# Patient Record
Sex: MALE | Race: BLACK OR AFRICAN AMERICAN | NOT HISPANIC OR LATINO | URBAN - METROPOLITAN AREA
[De-identification: names, ages, dates, MRNs, and addresses within clinical notes are randomized per-mention and may not be internally consistent; named-entity substitution may affect disease eponyms.]

---

## 2019-02-13 ENCOUNTER — OUTPATIENT (OUTPATIENT)
Dept: OUTPATIENT SERVICES | Facility: HOSPITAL | Age: 69
LOS: 1 days | End: 2019-02-13
Payer: MEDICARE

## 2019-02-13 VITALS
SYSTOLIC BLOOD PRESSURE: 127 MMHG | OXYGEN SATURATION: 99 % | HEART RATE: 66 BPM | WEIGHT: 235.01 LBS | HEIGHT: 74 IN | DIASTOLIC BLOOD PRESSURE: 71 MMHG | RESPIRATION RATE: 18 BRPM | TEMPERATURE: 98 F

## 2019-02-13 DIAGNOSIS — Z90.79 ACQUIRED ABSENCE OF OTHER GENITAL ORGAN(S): Chronic | ICD-10-CM

## 2019-02-13 DIAGNOSIS — E78.5 HYPERLIPIDEMIA, UNSPECIFIED: ICD-10-CM

## 2019-02-13 DIAGNOSIS — I10 ESSENTIAL (PRIMARY) HYPERTENSION: ICD-10-CM

## 2019-02-13 DIAGNOSIS — Z01.818 ENCOUNTER FOR OTHER PREPROCEDURAL EXAMINATION: ICD-10-CM

## 2019-02-13 DIAGNOSIS — M25.512 PAIN IN LEFT SHOULDER: ICD-10-CM

## 2019-02-13 PROCEDURE — G0463: CPT

## 2019-02-13 RX ORDER — SODIUM CHLORIDE 9 MG/ML
3 INJECTION INTRAMUSCULAR; INTRAVENOUS; SUBCUTANEOUS EVERY 8 HOURS
Qty: 0 | Refills: 0 | Status: DISCONTINUED | OUTPATIENT
Start: 2019-02-22 | End: 2019-03-02

## 2019-02-13 NOTE — H&P PST ADULT - NSANTHOSAYNRD_GEN_A_CORE
No. KYLE screening performed.  STOP BANG Legend: 0-2 = LOW Risk; 3-4 = INTERMEDIATE Risk; 5-8 = HIGH Risk

## 2019-02-13 NOTE — H&P PST ADULT - ASSESSMENT
69 yr old male with PMH of HTN, HLD, gout, prostate cancer treated with surgery presents with left shoulder pain.  Pt for left shoulder arthroscopy on 2/22/2019.

## 2019-02-13 NOTE — H&P PST ADULT - NEGATIVE CARDIOVASCULAR SYMPTOMS
no orthopnea/no peripheral edema/no paroxysmal nocturnal dyspnea/no palpitations/no dyspnea on exertion/no chest pain/no claudication

## 2019-02-13 NOTE — H&P PST ADULT - RS GEN PE MLT RESP DETAILS PC
normal/clear to auscultation bilaterally/good air movement/no chest wall tenderness/no intercostal retractions/breath sounds equal/airway patent/no rhonchi/no subcutaneous emphysema/no rales/no wheezes/respirations non-labored

## 2019-02-13 NOTE — H&P PST ADULT - FAMILY HISTORY
Mother  Still living? No  Cerebrovascular accident, Age at diagnosis: Age Unknown     Father  Still living? No  Family history of prostate cancer in father, Age at diagnosis: Age Unknown     Sibling  Still living? No  Family history of throat cancer, Age at diagnosis: Age Unknown  Family history of stomach cancer, Age at diagnosis: Age Unknown

## 2019-02-13 NOTE — H&P PST ADULT - PSH
History of abdominal prostatectomy    History of robot-assisted laparoscopic radical prostatectomy History of abdominal prostatectomy    History of lithotripsy    History of robot-assisted laparoscopic radical prostatectomy
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
## 2019-02-13 NOTE — H&P PST ADULT - NEGATIVE GASTROINTESTINAL SYMPTOMS
no diarrhea/no melena/no change in bowel habits/no vomiting/no constipation/no abdominal pain/no nausea/no flatulence

## 2019-02-13 NOTE — H&P PST ADULT - PMH
Gout    HLD (hyperlipidemia)    HTN (hypertension)    Prostate cancer Gout    HLD (hyperlipidemia)    HTN (hypertension)    Pain in left shoulder    Prostate cancer

## 2019-02-20 DIAGNOSIS — Z98.890 OTHER SPECIFIED POSTPROCEDURAL STATES: Chronic | ICD-10-CM

## 2019-02-21 ENCOUNTER — TRANSCRIPTION ENCOUNTER (OUTPATIENT)
Age: 69
End: 2019-02-21

## 2019-02-22 ENCOUNTER — RESULT REVIEW (OUTPATIENT)
Age: 69
End: 2019-02-22

## 2019-02-22 ENCOUNTER — OUTPATIENT (OUTPATIENT)
Dept: OUTPATIENT SERVICES | Facility: HOSPITAL | Age: 69
LOS: 1 days | End: 2019-02-22
Payer: MEDICARE

## 2019-02-22 VITALS
SYSTOLIC BLOOD PRESSURE: 133 MMHG | DIASTOLIC BLOOD PRESSURE: 73 MMHG | HEART RATE: 62 BPM | HEIGHT: 74 IN | OXYGEN SATURATION: 97 % | WEIGHT: 235.01 LBS | RESPIRATION RATE: 18 BRPM | TEMPERATURE: 97 F

## 2019-02-22 VITALS
RESPIRATION RATE: 16 BRPM | OXYGEN SATURATION: 99 % | HEART RATE: 66 BPM | TEMPERATURE: 98 F | DIASTOLIC BLOOD PRESSURE: 61 MMHG | SYSTOLIC BLOOD PRESSURE: 116 MMHG

## 2019-02-22 DIAGNOSIS — M25.512 PAIN IN LEFT SHOULDER: ICD-10-CM

## 2019-02-22 DIAGNOSIS — Z90.79 ACQUIRED ABSENCE OF OTHER GENITAL ORGAN(S): Chronic | ICD-10-CM

## 2019-02-22 DIAGNOSIS — Z98.890 OTHER SPECIFIED POSTPROCEDURAL STATES: Chronic | ICD-10-CM

## 2019-02-22 DIAGNOSIS — Z01.818 ENCOUNTER FOR OTHER PREPROCEDURAL EXAMINATION: ICD-10-CM

## 2019-02-22 PROCEDURE — 88304 TISSUE EXAM BY PATHOLOGIST: CPT

## 2019-02-22 PROCEDURE — 29824 SHO ARTHRS SRG DSTL CLAVICLC: CPT | Mod: LT

## 2019-02-22 PROCEDURE — 29826 SHO ARTHRS SRG DECOMPRESSION: CPT | Mod: LT

## 2019-02-22 PROCEDURE — 29827 SHO ARTHRS SRG RT8TR CUF RPR: CPT | Mod: LT

## 2019-02-22 PROCEDURE — 29823 SHO ARTHRS SRG XTNSV DBRDMT: CPT | Mod: LT

## 2019-02-22 PROCEDURE — 88304 TISSUE EXAM BY PATHOLOGIST: CPT | Mod: 26

## 2019-02-22 RX ORDER — IBUPROFEN 200 MG
1 TABLET ORAL
Qty: 0 | Refills: 0 | COMMUNITY

## 2019-02-22 RX ORDER — ACETAMINOPHEN 500 MG
2 TABLET ORAL
Qty: 0 | Refills: 0 | COMMUNITY

## 2019-02-22 RX ORDER — ACETAMINOPHEN 500 MG
1000 TABLET ORAL ONCE
Qty: 0 | Refills: 0 | Status: DISCONTINUED | OUTPATIENT
Start: 2019-02-22 | End: 2019-02-22

## 2019-02-22 RX ORDER — SODIUM CHLORIDE 9 MG/ML
1000 INJECTION, SOLUTION INTRAVENOUS
Qty: 0 | Refills: 0 | Status: DISCONTINUED | OUTPATIENT
Start: 2019-02-22 | End: 2019-03-02

## 2019-02-22 RX ORDER — ALLOPURINOL 300 MG
1 TABLET ORAL
Qty: 0 | Refills: 0 | COMMUNITY

## 2019-02-22 RX ORDER — FENTANYL CITRATE 50 UG/ML
25 INJECTION INTRAVENOUS
Qty: 0 | Refills: 0 | Status: DISCONTINUED | OUTPATIENT
Start: 2019-02-22 | End: 2019-02-22

## 2019-02-22 RX ORDER — CELECOXIB 200 MG/1
200 CAPSULE ORAL ONCE
Qty: 0 | Refills: 0 | Status: COMPLETED | OUTPATIENT
Start: 2019-02-22 | End: 2019-02-22

## 2019-02-22 RX ORDER — ACETAMINOPHEN 500 MG
975 TABLET ORAL ONCE
Qty: 0 | Refills: 0 | Status: COMPLETED | OUTPATIENT
Start: 2019-02-22 | End: 2019-02-22

## 2019-02-22 RX ORDER — OXYCODONE AND ACETAMINOPHEN 5; 325 MG/1; MG/1
1 TABLET ORAL EVERY 4 HOURS
Qty: 0 | Refills: 0 | Status: DISCONTINUED | OUTPATIENT
Start: 2019-02-22 | End: 2019-02-22

## 2019-02-22 RX ORDER — COLCHICINE 0.6 MG
1 TABLET ORAL
Qty: 0 | Refills: 0 | COMMUNITY

## 2019-02-22 RX ORDER — SODIUM CHLORIDE 9 MG/ML
1000 INJECTION, SOLUTION INTRAVENOUS
Qty: 0 | Refills: 0 | Status: DISCONTINUED | OUTPATIENT
Start: 2019-02-22 | End: 2019-02-22

## 2019-02-22 RX ORDER — OXYCODONE AND ACETAMINOPHEN 5; 325 MG/1; MG/1
2 TABLET ORAL EVERY 6 HOURS
Qty: 0 | Refills: 0 | Status: DISCONTINUED | OUTPATIENT
Start: 2019-02-22 | End: 2019-02-22

## 2019-02-22 RX ORDER — OLMESARTAN MEDOXOMIL-HYDROCHLOROTHIAZIDE 25; 40 MG/1; MG/1
1 TABLET, FILM COATED ORAL
Qty: 0 | Refills: 0 | COMMUNITY

## 2019-02-22 RX ORDER — ATORVASTATIN CALCIUM 80 MG/1
1 TABLET, FILM COATED ORAL
Qty: 0 | Refills: 0 | COMMUNITY

## 2019-02-22 RX ADMIN — Medication 975 MILLIGRAM(S): at 06:50

## 2019-02-22 RX ADMIN — CELECOXIB 200 MILLIGRAM(S): 200 CAPSULE ORAL at 06:51

## 2019-02-22 RX ADMIN — SODIUM CHLORIDE 3 MILLILITER(S): 9 INJECTION INTRAMUSCULAR; INTRAVENOUS; SUBCUTANEOUS at 06:52

## 2019-02-22 NOTE — ASU PATIENT PROFILE, ADULT - PSH
History of abdominal prostatectomy    History of lithotripsy    History of robot-assisted laparoscopic radical prostatectomy

## 2019-02-22 NOTE — ASU DISCHARGE PLAN (ADULT/PEDIATRIC). - MEDICATION SUMMARY - MEDICATIONS TO TAKE
I will START or STAY ON the medications listed below when I get home from the hospital:    oxycodone-acetaminophen 5 mg-325 mg oral tablet  -- 1 tab(s) by mouth every 6 hours, As Needed -for Pain  MDD:6   -- Indication: For Left shoulder pain

## 2019-02-22 NOTE — BRIEF OPERATIVE NOTE - POST-OP DX
Complete tear of left rotator cuff  02/22/2019  and A/C Joint arthritis  Active  Seth Smallwood  Impingement syndrome of left shoulder  02/22/2019    Active  Seth Smallwood

## 2019-02-22 NOTE — ASU PREOP CHECKLIST - RESPIRATORY RATE (BREATHS/MIN)
[age]yo M with h/o recetn CVA (4/20/18) on eliquis, DM, HTN, ROBE, presenting s/p fall.  Abrasions to left knee.  No visible head trauma.  Cardiac: s1s2  Lungs: CTABL  Abd: soft ntnd, no peripheral edema.  No midline spinal tenderenss, no hip deformities.  Plan: CT head/c-spine, xrays of chest, pelvis, and b/l knees.  If no acute findings/bleeding/fractures, will be stable for dc back to [facility] rehab.

## 2019-02-22 NOTE — BRIEF OPERATIVE NOTE - PROCEDURE
<<-----Click on this checkbox to enter Procedure Rotator cuff repair  02/22/2019  Acromioplasty,Excision distal clavicle and subacromial decompression.  Active  SPILLAI  Arthroscopy  02/22/2019  [facility] shoulder  Active  SPILLAI

## 2019-02-27 LAB — SURGICAL PATHOLOGY STUDY: SIGNIFICANT CHANGE UP

## 2020-04-11 ENCOUNTER — EMERGENCY (EMERGENCY)
Facility: HOSPITAL | Age: 70
LOS: 1 days | Discharge: HOME | End: 2020-04-11
Admitting: EMERGENCY MEDICINE
Payer: MEDICARE

## 2020-04-11 VITALS
DIASTOLIC BLOOD PRESSURE: 84 MMHG | HEART RATE: 77 BPM | SYSTOLIC BLOOD PRESSURE: 144 MMHG | OXYGEN SATURATION: 96 % | RESPIRATION RATE: 18 BRPM | TEMPERATURE: 100 F

## 2020-04-11 DIAGNOSIS — Z98.890 OTHER SPECIFIED POSTPROCEDURAL STATES: Chronic | ICD-10-CM

## 2020-04-11 DIAGNOSIS — Z90.79 ACQUIRED ABSENCE OF OTHER GENITAL ORGAN(S): Chronic | ICD-10-CM

## 2020-04-11 PROBLEM — M10.9 GOUT, UNSPECIFIED: Chronic | Status: ACTIVE | Noted: 2019-02-13

## 2020-04-11 PROBLEM — M25.512 PAIN IN LEFT SHOULDER: Chronic | Status: ACTIVE | Noted: 2019-02-13

## 2020-04-11 PROBLEM — C61 MALIGNANT NEOPLASM OF PROSTATE: Chronic | Status: ACTIVE | Noted: 2019-02-13

## 2020-04-11 LAB
ALBUMIN SERPL ELPH-MCNC: 4 G/DL — SIGNIFICANT CHANGE UP (ref 3.3–5)
ALP SERPL-CCNC: 66 U/L — SIGNIFICANT CHANGE UP (ref 40–120)
ALT FLD-CCNC: 15 U/L — SIGNIFICANT CHANGE UP (ref 4–41)
ANION GAP SERPL CALC-SCNC: 13 MMO/L — SIGNIFICANT CHANGE UP (ref 7–14)
AST SERPL-CCNC: 20 U/L — SIGNIFICANT CHANGE UP (ref 4–40)
BASOPHILS # BLD AUTO: 0.03 K/UL — SIGNIFICANT CHANGE UP (ref 0–0.2)
BASOPHILS NFR BLD AUTO: 0.5 % — SIGNIFICANT CHANGE UP (ref 0–2)
BILIRUB SERPL-MCNC: 0.5 MG/DL — SIGNIFICANT CHANGE UP (ref 0.2–1.2)
BUN SERPL-MCNC: 39 MG/DL — HIGH (ref 7–23)
CALCIUM SERPL-MCNC: 9.6 MG/DL — SIGNIFICANT CHANGE UP (ref 8.4–10.5)
CHLORIDE SERPL-SCNC: 101 MMOL/L — SIGNIFICANT CHANGE UP (ref 98–107)
CO2 SERPL-SCNC: 23 MMOL/L — SIGNIFICANT CHANGE UP (ref 22–31)
CREAT SERPL-MCNC: 2.09 MG/DL — HIGH (ref 0.5–1.3)
EOSINOPHIL # BLD AUTO: 0.31 K/UL — SIGNIFICANT CHANGE UP (ref 0–0.5)
EOSINOPHIL NFR BLD AUTO: 4.9 % — SIGNIFICANT CHANGE UP (ref 0–6)
GLUCOSE SERPL-MCNC: 87 MG/DL — SIGNIFICANT CHANGE UP (ref 70–99)
HCT VFR BLD CALC: 47.7 % — SIGNIFICANT CHANGE UP (ref 39–50)
HGB BLD-MCNC: 15.8 G/DL — SIGNIFICANT CHANGE UP (ref 13–17)
IMM GRANULOCYTES NFR BLD AUTO: 0.5 % — SIGNIFICANT CHANGE UP (ref 0–1.5)
LIDOCAIN IGE QN: 81.8 U/L — HIGH (ref 7–60)
LYMPHOCYTES # BLD AUTO: 1.51 K/UL — SIGNIFICANT CHANGE UP (ref 1–3.3)
LYMPHOCYTES # BLD AUTO: 24 % — SIGNIFICANT CHANGE UP (ref 13–44)
MCHC RBC-ENTMCNC: 28.2 PG — SIGNIFICANT CHANGE UP (ref 27–34)
MCHC RBC-ENTMCNC: 33.1 % — SIGNIFICANT CHANGE UP (ref 32–36)
MCV RBC AUTO: 85 FL — SIGNIFICANT CHANGE UP (ref 80–100)
MONOCYTES # BLD AUTO: 0.84 K/UL — SIGNIFICANT CHANGE UP (ref 0–0.9)
MONOCYTES NFR BLD AUTO: 13.4 % — SIGNIFICANT CHANGE UP (ref 2–14)
NEUTROPHILS # BLD AUTO: 3.56 K/UL — SIGNIFICANT CHANGE UP (ref 1.8–7.4)
NEUTROPHILS NFR BLD AUTO: 56.7 % — SIGNIFICANT CHANGE UP (ref 43–77)
NRBC # FLD: 0 K/UL — SIGNIFICANT CHANGE UP (ref 0–0)
PLATELET # BLD AUTO: 203 K/UL — SIGNIFICANT CHANGE UP (ref 150–400)
PMV BLD: 12.2 FL — SIGNIFICANT CHANGE UP (ref 7–13)
POTASSIUM SERPL-MCNC: 4.4 MMOL/L — SIGNIFICANT CHANGE UP (ref 3.5–5.3)
POTASSIUM SERPL-SCNC: 4.4 MMOL/L — SIGNIFICANT CHANGE UP (ref 3.5–5.3)
PROT SERPL-MCNC: 8 G/DL — SIGNIFICANT CHANGE UP (ref 6–8.3)
RBC # BLD: 5.61 M/UL — SIGNIFICANT CHANGE UP (ref 4.2–5.8)
RBC # FLD: 13.2 % — SIGNIFICANT CHANGE UP (ref 10.3–14.5)
SODIUM SERPL-SCNC: 137 MMOL/L — SIGNIFICANT CHANGE UP (ref 135–145)
WBC # BLD: 6.28 K/UL — SIGNIFICANT CHANGE UP (ref 3.8–10.5)
WBC # FLD AUTO: 6.28 K/UL — SIGNIFICANT CHANGE UP (ref 3.8–10.5)

## 2020-04-11 PROCEDURE — 71045 X-RAY EXAM CHEST 1 VIEW: CPT | Mod: 26

## 2020-04-11 PROCEDURE — 99284 EMERGENCY DEPT VISIT MOD MDM: CPT

## 2020-04-11 PROCEDURE — 74176 CT ABD & PELVIS W/O CONTRAST: CPT | Mod: 26

## 2020-04-11 RX ORDER — SODIUM CHLORIDE 9 MG/ML
500 INJECTION INTRAMUSCULAR; INTRAVENOUS; SUBCUTANEOUS ONCE
Refills: 0 | Status: COMPLETED | OUTPATIENT
Start: 2020-04-11 | End: 2020-04-11

## 2020-04-11 RX ADMIN — SODIUM CHLORIDE 500 MILLILITER(S): 9 INJECTION INTRAMUSCULAR; INTRAVENOUS; SUBCUTANEOUS at 15:55

## 2020-04-11 NOTE — ED PROVIDER NOTE - CLINICAL SUMMARY MEDICAL DECISION MAKING FREE TEXT BOX
69 Y/O M PMH HTN HLD Gout C/O 2 weeks of fevers which he states are improving and intermittent SOB. Pt states while ill he felt less of an appetite so he did not eat as much. Pt states he is a former smoker, states he had a colonoscopy in the past but does not recall when. Lungs CTAB, abdomen is soft, ambulatory O2 sat is 96%, no abdominal henok 71 Y/O M PMH HTN HLD Gout C/O 2 weeks of fevers which he states are improving and intermittent SOB. Pt states while ill he felt less of an appetite so he did not eat as much. Pt states he is a former smoker, states he had a colonoscopy in the past but does not recall when. Lungs CTAB, abdomen is soft, ambulatory O2 sat is 96%, no abdominal tenderness, pt is not jaundiced and is well appearing, has no abdominal pain, do not suspect cholangitis. Will Xray for pulmonary consolidation and will CT abdomen for mass. If neg pt is likely stable for outpatient F/U.

## 2020-04-11 NOTE — ED PROVIDER NOTE - OBJECTIVE STATEMENT
69 Y/O M PMH HTN HLD Gout C/O 2 weeks of fevers which he states are improving and intermittent SOB. Pt states while ill he felt less of an appetite so he did not eat as much.  Denies CP, nausea/vomiting/diarrhea or any other sx or complaints. 69 Y/O M PMH HTN HLD Gout C/O 2 weeks of fevers which he states are improving and intermittent SOB. Pt states while ill he felt less of an appetite so he did not eat as much. Pt states he is a former smoker, states he had a colonoscopy in the past but does not recall when.  Denies CP, nausea/vomiting/diarrhea or any other sx or complaints. 69 Y/O M PMH HTN HLD Gout C/O 2 weeks of fevers which he states are improving and intermittent SOB which pt states has improved. Pt states while ill he felt less of an appetite so he did not eat as much. Pt states he is a former smoker, states he had a colonoscopy in the past but does not recall when.  Denies CP, nausea/vomiting/diarrhea or any other sx or complaints. Pt states he came todfay because he was concerned about his potential to have COVID-19. 71 Y/O M PMH HTN HLD Gout C/O 2 weeks of fevers which he states are improving and intermittent SOB which pt states has improved. Pt states while ill he felt less of an appetite so he did not eat as much. Pt states he is a former smoker, states he had a colonoscopy in the past but does not recall when.  Denies CP, nausea/vomiting/diarrhea or any other sx or complaints. Pt states he came today because he was concerned about his potential to have COVID-19.

## 2020-04-11 NOTE — ED PROVIDER NOTE - NSFOLLOWUPINSTRUCTIONS_ED_ALL_ED_FT
Follow up with your primary doctor and bring this packet which includes copies of your results. Your CT scan is not entirely normal so bring this packet to show the results to your doctor but it does not show anything so severe you would need intervention for in the Emergency Department. Rest and drink plenty of fluids.  Advance activity as tolerated.  Continue all previously prescribed medications as directed.  Follow up with your primary care physician in 48-72 hours- bring copies of your results.  Return to the ER for worsening or persistent symptoms, and/or ANY NEW OR CONCERNING SYMPTOMS. If you have issues obtaining follow up, please call: [phone] to obtain a doctor or specialist who takes your insurance in your area.  You may call [phone] to make an appointment with the internal medicine clinic.

## 2020-04-11 NOTE — ED PROVIDER NOTE - PATIENT PORTAL LINK FT
You can access the FollowMyHealth Patient Portal offered by [facility] by registering at the following website: [email or web address]. By joining [employer]’s FollowMyHealth portal, you will also be able to view your health information using other applications (apps) compatible with our system.

## 2020-04-11 NOTE — ED ADULT TRIAGE NOTE - CHIEF COMPLAINT QUOTE
Pt c/o 3 weeks of SOB/fever and weakness--pt c/o 10 lb weight loss in 3 weeks. Pt states this is off and on x2 weeks

## 2022-06-01 ENCOUNTER — EMERGENCY (EMERGENCY)
Facility: HOSPITAL | Age: 72
LOS: 1 days | Discharge: HOME | End: 2022-06-01
Attending: STUDENT IN AN ORGANIZED HEALTH CARE EDUCATION/TRAINING PROGRAM | Admitting: STUDENT IN AN ORGANIZED HEALTH CARE EDUCATION/TRAINING PROGRAM
Payer: MEDICARE

## 2022-06-01 VITALS
DIASTOLIC BLOOD PRESSURE: 78 MMHG | HEIGHT: 74 IN | OXYGEN SATURATION: 99 % | HEART RATE: 74 BPM | SYSTOLIC BLOOD PRESSURE: 163 MMHG | TEMPERATURE: 98 F

## 2022-06-01 DIAGNOSIS — Z90.79 ACQUIRED ABSENCE OF OTHER GENITAL ORGAN(S): Chronic | ICD-10-CM

## 2022-06-01 DIAGNOSIS — Z98.890 OTHER SPECIFIED POSTPROCEDURAL STATES: Chronic | ICD-10-CM

## 2022-06-01 PROCEDURE — 99282 EMERGENCY DEPT VISIT SF MDM: CPT

## 2022-06-01 NOTE — ED PROVIDER NOTE - NSFOLLOWUPINSTRUCTIONS_ED_ALL_ED_FT
You were evaluated in the Emergency Department for ABDOMINAL SWELLING.      There are no signs of emergency conditions requiring admission to the hospital on today's workup.      We recommend that you see your primary care physician within the next 3 days for follow up.  Bring a copy of your discharge paperwork (including any test results) to your doctor.    Please see the GENERAL SURGEON within the next week for follow up.    ***Return to the emergency department if you have any other new/concerning symptoms, including but not limited to: PERSISTENT ABDOMINAL PAIN, REDNESS OR SWELLING THAT DOES NOT IMPROVE, VOMITING, DIFFICULTY HAVING A BOWEL MOVEMENT, FEVERS***

## 2022-06-01 NOTE — ED PROVIDER NOTE - PHYSICAL EXAMINATION
Gen: NAD, AAOx3, non-toxic appearing  HEENT: NCAT, normal conjunctiva, oral mucosa moist  Lung: speaking in full sentences, good aeration bilaterally, lungs CTA b/l  CV: regular rate and rhythm. cap refill <2x. peripheral pulses 2+bilaterally   Abd: soft, ND, NT, no guarding  MSK: no visible deformities  Neuro: No focal deficits  Skin: Intact  Psych: normal affect   : Performed with Dr. Braun. No scrotal swelling or masses palpated. No inguinal swelling.

## 2022-06-01 NOTE — ED PROVIDER NOTE - NS ED ROS FT
Constitutional:  (-) fever, (-) chills, (-) fatigue  Eyes:  (-) eye pain (-) visual changes  ENMT: (-) nasal discharge, (-) sore throat. (-) neck pain or stiffness  Cardiac: (-) chest pain (-) palpitations  Respiratory:  (-) cough (-) shortness of breath  GI:  (-) nausea (-) vomiting (-) diarrhea (-) abdominal pain  :  (-) dysuria (-) frequency (-) burning  MS:  (-) back pain (-) joint pain  Neuro:  (-) headache (-) numbness (-) tingling (-) focal weakness  Skin:  (-) rash  Except as documented in the HPI,  all other systems are negative

## 2022-06-01 NOTE — ED PROVIDER NOTE - PROGRESS NOTE DETAILS
Rahel, PGY2: patient given strict return precautions. comfortable with dc. give rapid referral and list of contact information.

## 2022-06-01 NOTE — ED PROVIDER NOTE - OBJECTIVE STATEMENT
71 yo M with hx of HTN, HLD, gout, PSHx of prostatectomy presenting with intermittent RLQ swelling and pain x 1 week. States that the symptoms occur while walking and improve after a few minutes with lying back. Denies nausea, vomiting, changes in BMs, fevers/chills. Denies redness over the area. Patient asymptomatic at this time.

## 2022-06-01 NOTE — ED PROVIDER NOTE - IV ALTEPLASE DOOR HIDDEN
show Griseofulvin Counseling:  I discussed with the patient the risks of griseofulvin including but not limited to photosensitivity, cytopenia, liver damage, nausea/vomiting and severe allergy.  The patient understands that this medication is best absorbed when taken with a fatty meal (e.g., ice cream or french fries).

## 2022-06-01 NOTE — ED PROVIDER NOTE - ATTENDING CONTRIBUTION TO CARE
I (Aparna) agree with above, I performed a history and physical. Counseled sybil medical staff, physician assistant, and/or medical student on medical decision making as documented. Medical decisions and treatment interventions were made in real time during the patient encounter. Additionally and/or with the following exceptions: The patient presented to the ED with report of a right lower quadrant mass the bulged out after bending over to pick something up. Has had this before and usually it goes back in. This time it also went back in he says. On exam the patient had no palpable hernias in the ingunal canal, but does have an implanted penis pump with the pump palpable. No other scrotal masses. The patient had a possible subtle abdominal wall defect less than 2 finger tip widths, but no hernia palpable with coughing. Given gen surg follow up. strict return precautions. Return precautions reviewed. Patient verbalized understand of conditions for return and plan for follow up. Patient was instructed to utilize [phone] to obtain follow up as indicated.

## 2022-06-01 NOTE — ED PROVIDER NOTE - PATIENT PORTAL LINK FT
You can access the FollowMyHealth Patient Portal offered by [facility] by registering at the following website: [email or web address]. By joining [employer]’s FollowMyHealth portal, you will also be able to view your health information using other applications (apps) compatible with our system.

## 2022-06-01 NOTE — ED PROVIDER NOTE - NSICDXPASTMEDICALHX_GEN_ALL_CORE_FT
PAST MEDICAL HISTORY:  Gout     HLD (hyperlipidemia)     HTN (hypertension)     Pain in left shoulder     Prostate cancer

## 2022-06-01 NOTE — ED PROVIDER NOTE - NSICDXPASTSURGICALHX_GEN_ALL_CORE_FT
PAST SURGICAL HISTORY:  History of abdominal prostatectomy     History of lithotripsy     History of robot-assisted laparoscopic radical prostatectomy

## 2022-06-01 NOTE — ED PROVIDER NOTE - CLINICAL SUMMARY MEDICAL DECISION MAKING FREE TEXT BOX
71 yo M with hx of HTN, HLD, gout, PSHx of prostatectomy presenting with intermittent RLQ swelling and pain x 1 week. Occurs with ambulation and leaning forward, improved with lying back. Asymptomatic at this time without appreciated swelling, masses or skin changes on exam. Plan for dc with rapid general surgery follow up.

## 2022-06-01 NOTE — ED PROVIDER NOTE - NSICDXFAMILYHX_GEN_ALL_CORE_FT
FAMILY HISTORY:  Father  Still living? No  Family history of prostate cancer in father, Age at diagnosis: Age Unknown    Mother  Still living? No  Cerebrovascular accident, Age at diagnosis: Age Unknown    Sibling  Still living? No  Family history of stomach cancer, Age at diagnosis: Age Unknown  Family history of throat cancer, Age at diagnosis: Age Unknown

## 2022-06-08 ENCOUNTER — APPOINTMENT (OUTPATIENT)
Dept: SURGERY | Facility: CLINIC | Age: 72
End: 2022-06-08
Payer: MEDICARE

## 2022-06-08 VITALS
HEIGHT: 74 IN | HEART RATE: 74 BPM | DIASTOLIC BLOOD PRESSURE: 82 MMHG | BODY MASS INDEX: 29 KG/M2 | OXYGEN SATURATION: 95 % | TEMPERATURE: 98.6 F | SYSTOLIC BLOOD PRESSURE: 137 MMHG | WEIGHT: 226 LBS

## 2022-06-08 DIAGNOSIS — E78.00 PURE HYPERCHOLESTEROLEMIA, UNSPECIFIED: ICD-10-CM

## 2022-06-08 DIAGNOSIS — E11.9 TYPE 2 DIABETES MELLITUS W/OUT COMPLICATIONS: ICD-10-CM

## 2022-06-08 PROCEDURE — 99203 OFFICE O/P NEW LOW 30 MIN: CPT

## 2022-06-08 RX ORDER — ALLOPURINOL 100 MG/1
100 TABLET ORAL
Refills: 0 | Status: ACTIVE | COMMUNITY

## 2022-06-08 RX ORDER — AMLODIPINE BESYLATE 5 MG/1
5 TABLET ORAL
Refills: 0 | Status: ACTIVE | COMMUNITY

## 2022-06-08 RX ORDER — COLD-HOT PACK
EACH MISCELLANEOUS
Refills: 0 | Status: ACTIVE | COMMUNITY

## 2022-06-08 RX ORDER — ATORVASTATIN CALCIUM 40 MG/1
40 TABLET, FILM COATED ORAL
Refills: 0 | Status: ACTIVE | COMMUNITY

## 2022-06-08 RX ORDER — OLMESARTAN MEDOXOMIL 40 MG/1
TABLET, FILM COATED ORAL
Refills: 0 | Status: ACTIVE | COMMUNITY

## 2022-07-20 ENCOUNTER — OUTPATIENT (OUTPATIENT)
Dept: OUTPATIENT SERVICES | Facility: HOSPITAL | Age: 72
LOS: 1 days | Discharge: HOME | End: 2022-07-20

## 2022-07-20 VITALS
WEIGHT: 235.23 LBS | SYSTOLIC BLOOD PRESSURE: 166 MMHG | HEIGHT: 74 IN | RESPIRATION RATE: 17 BRPM | HEART RATE: 60 BPM | OXYGEN SATURATION: 96 % | TEMPERATURE: 97 F | DIASTOLIC BLOOD PRESSURE: 72 MMHG

## 2022-07-20 DIAGNOSIS — E78.5 HYPERLIPIDEMIA, UNSPECIFIED: ICD-10-CM

## 2022-07-20 DIAGNOSIS — Z98.890 OTHER SPECIFIED POSTPROCEDURAL STATES: Chronic | ICD-10-CM

## 2022-07-20 DIAGNOSIS — K40.20 BILATERAL INGUINAL HERNIA, WITHOUT OBSTRUCTION OR GANGRENE, NOT SPECIFIED AS RECURRENT: ICD-10-CM

## 2022-07-20 DIAGNOSIS — Z90.79 ACQUIRED ABSENCE OF OTHER GENITAL ORGAN(S): Chronic | ICD-10-CM

## 2022-07-20 DIAGNOSIS — E11.9 TYPE 2 DIABETES MELLITUS WITHOUT COMPLICATIONS: ICD-10-CM

## 2022-07-20 DIAGNOSIS — H40.9 UNSPECIFIED GLAUCOMA: ICD-10-CM

## 2022-07-20 DIAGNOSIS — M10.9 GOUT, UNSPECIFIED: ICD-10-CM

## 2022-07-20 DIAGNOSIS — I10 ESSENTIAL (PRIMARY) HYPERTENSION: ICD-10-CM

## 2022-07-20 DIAGNOSIS — Z01.818 ENCOUNTER FOR OTHER PREPROCEDURAL EXAMINATION: ICD-10-CM

## 2022-07-20 LAB
ALBUMIN SERPL ELPH-MCNC: 3.7 G/DL — SIGNIFICANT CHANGE UP (ref 3.3–5)
ALP SERPL-CCNC: 69 U/L — SIGNIFICANT CHANGE UP (ref 40–120)
ALT FLD-CCNC: 35 U/L — SIGNIFICANT CHANGE UP (ref 12–78)
ANION GAP SERPL CALC-SCNC: 6 MMOL/L — SIGNIFICANT CHANGE UP (ref 5–17)
APTT BLD: 29.4 SEC — SIGNIFICANT CHANGE UP (ref 27.5–35.5)
AST SERPL-CCNC: 29 U/L — SIGNIFICANT CHANGE UP (ref 15–37)
BASOPHILS # BLD AUTO: 0.03 K/UL — SIGNIFICANT CHANGE UP (ref 0–0.2)
BASOPHILS NFR BLD AUTO: 0.5 % — SIGNIFICANT CHANGE UP (ref 0–2)
BILIRUB SERPL-MCNC: 0.6 MG/DL — SIGNIFICANT CHANGE UP (ref 0.2–1.2)
BUN SERPL-MCNC: 29 MG/DL — HIGH (ref 7–23)
CALCIUM SERPL-MCNC: 9.3 MG/DL — SIGNIFICANT CHANGE UP (ref 8.5–10.1)
CHLORIDE SERPL-SCNC: 109 MMOL/L — HIGH (ref 96–108)
CO2 SERPL-SCNC: 27 MMOL/L — SIGNIFICANT CHANGE UP (ref 22–31)
CREAT SERPL-MCNC: 1.84 MG/DL — HIGH (ref 0.5–1.3)
EGFR: 38 ML/MIN/1.73M2 — LOW
EOSINOPHIL # BLD AUTO: 0.3 K/UL — SIGNIFICANT CHANGE UP (ref 0–0.5)
EOSINOPHIL NFR BLD AUTO: 4.6 % — SIGNIFICANT CHANGE UP (ref 0–6)
GLUCOSE SERPL-MCNC: 139 MG/DL — HIGH (ref 70–99)
HCT VFR BLD CALC: 45.1 % — SIGNIFICANT CHANGE UP (ref 39–50)
HGB BLD-MCNC: 15 G/DL — SIGNIFICANT CHANGE UP (ref 13–17)
IMM GRANULOCYTES NFR BLD AUTO: 0.5 % — SIGNIFICANT CHANGE UP (ref 0–1.5)
INR BLD: 1.05 RATIO — SIGNIFICANT CHANGE UP (ref 0.88–1.16)
LYMPHOCYTES # BLD AUTO: 1.55 K/UL — SIGNIFICANT CHANGE UP (ref 1–3.3)
LYMPHOCYTES # BLD AUTO: 24 % — SIGNIFICANT CHANGE UP (ref 13–44)
MCHC RBC-ENTMCNC: 28.5 PG — SIGNIFICANT CHANGE UP (ref 27–34)
MCHC RBC-ENTMCNC: 33.3 G/DL — SIGNIFICANT CHANGE UP (ref 32–36)
MCV RBC AUTO: 85.7 FL — SIGNIFICANT CHANGE UP (ref 80–100)
MONOCYTES # BLD AUTO: 0.72 K/UL — SIGNIFICANT CHANGE UP (ref 0–0.9)
MONOCYTES NFR BLD AUTO: 11.1 % — SIGNIFICANT CHANGE UP (ref 2–14)
NEUTROPHILS # BLD AUTO: 3.84 K/UL — SIGNIFICANT CHANGE UP (ref 1.8–7.4)
NEUTROPHILS NFR BLD AUTO: 59.3 % — SIGNIFICANT CHANGE UP (ref 43–77)
NRBC # BLD: 0 /100 WBCS — SIGNIFICANT CHANGE UP (ref 0–0)
PLATELET # BLD AUTO: 187 K/UL — SIGNIFICANT CHANGE UP (ref 150–400)
POTASSIUM SERPL-MCNC: 4.2 MMOL/L — SIGNIFICANT CHANGE UP (ref 3.5–5.3)
POTASSIUM SERPL-SCNC: 4.2 MMOL/L — SIGNIFICANT CHANGE UP (ref 3.5–5.3)
PROT SERPL-MCNC: 7.8 GM/DL — SIGNIFICANT CHANGE UP (ref 6–8.3)
PROTHROM AB SERPL-ACNC: 12.5 SEC — SIGNIFICANT CHANGE UP (ref 10.5–13.4)
RBC # BLD: 5.26 M/UL — SIGNIFICANT CHANGE UP (ref 4.2–5.8)
RBC # FLD: 14 % — SIGNIFICANT CHANGE UP (ref 10.3–14.5)
SODIUM SERPL-SCNC: 142 MMOL/L — SIGNIFICANT CHANGE UP (ref 135–145)
WBC # BLD: 6.47 K/UL — SIGNIFICANT CHANGE UP (ref 3.8–10.5)
WBC # FLD AUTO: 6.47 K/UL — SIGNIFICANT CHANGE UP (ref 3.8–10.5)

## 2022-07-20 PROCEDURE — 93010 ELECTROCARDIOGRAM REPORT: CPT

## 2022-07-20 NOTE — H&P PST ADULT - PROBLEM SELECTOR PLAN 1
Pre-op instructions given. Pt verbalized understanding  Chlorhexidine wash instructions given  Accu-Chek ordered STAT for day of procedure  Pending: M/C for abnormal ekg  Pending: Covidpcr test/results

## 2022-07-20 NOTE — H&P PST ADULT - MUSCULOSKELETAL
details… ROM intact/normal gait/strength 5/5 bilateral upper extremities/strength 5/5 bilateral lower extremities

## 2022-07-20 NOTE — H&P PST ADULT - NSICDXPASTMEDICALHX_GEN_ALL_CORE_FT
PAST MEDICAL HISTORY:  Gout     HLD (hyperlipidemia)     HTN (hypertension)     Inguinal hernia     Pain in left shoulder     Prostate cancer     Type 2 diabetes mellitus

## 2022-07-20 NOTE — H&P PST ADULT - HISTORY OF PRESENT ILLNESS
71yo male with medical h/o HTN, HDL, Gout, T2DM  and Glaucoma, reports  71yo male with medical h/o HTN, HDL, Gout, T2DM  and Glaucoma, reports Inguinal hernia, bilateral and presents today for PST for scheduled Laparoscopic Robotic Assisted Bilateral Inguinal Hernia Repair on 7/26/2022 73yo male with medical h/o HTN, HDL, Gout, T2DM and Glaucoma, reports Inguinal hernia, and presents today for PST for scheduled Laparoscopic Robotic Assisted Bilateral Inguinal Hernia Repair on 7/26/2022

## 2022-07-20 NOTE — H&P PST ADULT - NSICDXPASTSURGICALHX_GEN_ALL_CORE_FT
PAST SURGICAL HISTORY:  H/O repair of left rotator cuff     History of abdominal prostatectomy     History of lithotripsy     History of robot-assisted laparoscopic radical prostatectomy

## 2022-07-23 ENCOUNTER — LABORATORY RESULT (OUTPATIENT)
Age: 72
End: 2022-07-23

## 2022-07-25 ENCOUNTER — TRANSCRIPTION ENCOUNTER (OUTPATIENT)
Age: 72
End: 2022-07-25

## 2022-07-26 ENCOUNTER — TRANSCRIPTION ENCOUNTER (OUTPATIENT)
Age: 72
End: 2022-07-26

## 2022-07-26 ENCOUNTER — OUTPATIENT (OUTPATIENT)
Dept: OUTPATIENT SERVICES | Facility: HOSPITAL | Age: 72
LOS: 1 days | Discharge: HOME | End: 2022-07-26

## 2022-07-26 ENCOUNTER — APPOINTMENT (OUTPATIENT)
Dept: SURGERY | Facility: HOSPITAL | Age: 72
End: 2022-07-26

## 2022-07-26 VITALS
HEART RATE: 68 BPM | TEMPERATURE: 98 F | OXYGEN SATURATION: 95 % | DIASTOLIC BLOOD PRESSURE: 74 MMHG | SYSTOLIC BLOOD PRESSURE: 152 MMHG | RESPIRATION RATE: 18 BRPM

## 2022-07-26 VITALS
OXYGEN SATURATION: 98 % | WEIGHT: 235.23 LBS | DIASTOLIC BLOOD PRESSURE: 78 MMHG | HEIGHT: 74 IN | TEMPERATURE: 98 F | RESPIRATION RATE: 16 BRPM | HEART RATE: 58 BPM | SYSTOLIC BLOOD PRESSURE: 131 MMHG

## 2022-07-26 DIAGNOSIS — Z98.890 OTHER SPECIFIED POSTPROCEDURAL STATES: Chronic | ICD-10-CM

## 2022-07-26 DIAGNOSIS — Z90.79 ACQUIRED ABSENCE OF OTHER GENITAL ORGAN(S): Chronic | ICD-10-CM

## 2022-07-26 LAB
GLUCOSE BLDC GLUCOMTR-MCNC: 100 MG/DL — HIGH (ref 70–99)
GLUCOSE BLDC GLUCOMTR-MCNC: 128 MG/DL — HIGH (ref 70–99)

## 2022-07-26 PROCEDURE — 49650 LAP ING HERNIA REPAIR INIT: CPT

## 2022-07-26 PROCEDURE — S2900 ROBOTIC SURGICAL SYSTEM: CPT | Mod: NC,GC

## 2022-07-26 DEVICE — MESH HERNIA INGUINAL PROGRIP LAPAROSCOPIC 15 X 10CM RIGHT: Type: IMPLANTABLE DEVICE | Site: BILATERAL | Status: FUNCTIONAL

## 2022-07-26 RX ORDER — SODIUM CHLORIDE 9 MG/ML
1000 INJECTION, SOLUTION INTRAVENOUS
Refills: 0 | Status: DISCONTINUED | OUTPATIENT
Start: 2022-07-26 | End: 2022-07-26

## 2022-07-26 RX ORDER — SODIUM CHLORIDE 9 MG/ML
3 INJECTION INTRAMUSCULAR; INTRAVENOUS; SUBCUTANEOUS EVERY 8 HOURS
Refills: 0 | Status: DISCONTINUED | OUTPATIENT
Start: 2022-07-26 | End: 2022-07-26

## 2022-07-26 RX ORDER — METFORMIN HYDROCHLORIDE 850 MG/1
1 TABLET ORAL
Qty: 0 | Refills: 0 | DISCHARGE

## 2022-07-26 RX ORDER — OXYCODONE HYDROCHLORIDE 5 MG/1
5 TABLET ORAL ONCE
Refills: 0 | Status: DISCONTINUED | OUTPATIENT
Start: 2022-07-26 | End: 2022-07-26

## 2022-07-26 RX ORDER — OMEGA-3 ACID ETHYL ESTERS 1 G
1 CAPSULE ORAL
Qty: 0 | Refills: 0 | DISCHARGE

## 2022-07-26 RX ORDER — HYDROMORPHONE HYDROCHLORIDE 2 MG/ML
0.5 INJECTION INTRAMUSCULAR; INTRAVENOUS; SUBCUTANEOUS
Refills: 0 | Status: DISCONTINUED | OUTPATIENT
Start: 2022-07-26 | End: 2022-07-26

## 2022-07-26 RX ORDER — ACETAMINOPHEN 500 MG
1000 TABLET ORAL ONCE
Refills: 0 | Status: COMPLETED | OUTPATIENT
Start: 2022-07-26 | End: 2022-07-26

## 2022-07-26 RX ORDER — PREGABALIN 225 MG/1
1 CAPSULE ORAL
Qty: 0 | Refills: 0 | DISCHARGE

## 2022-07-26 RX ORDER — AMLODIPINE BESYLATE 2.5 MG/1
1 TABLET ORAL
Qty: 0 | Refills: 0 | DISCHARGE

## 2022-07-26 RX ORDER — FENTANYL CITRATE 50 UG/ML
50 INJECTION INTRAVENOUS
Refills: 0 | Status: DISCONTINUED | OUTPATIENT
Start: 2022-07-26 | End: 2022-07-26

## 2022-07-26 RX ADMIN — HYDROMORPHONE HYDROCHLORIDE 0.5 MILLIGRAM(S): 2 INJECTION INTRAMUSCULAR; INTRAVENOUS; SUBCUTANEOUS at 15:44

## 2022-07-26 RX ADMIN — HYDROMORPHONE HYDROCHLORIDE 0.5 MILLIGRAM(S): 2 INJECTION INTRAMUSCULAR; INTRAVENOUS; SUBCUTANEOUS at 15:28

## 2022-07-26 RX ADMIN — Medication 1000 MILLIGRAM(S): at 15:44

## 2022-07-26 RX ADMIN — Medication 400 MILLIGRAM(S): at 15:28

## 2022-07-26 RX ADMIN — SODIUM CHLORIDE 75 MILLILITER(S): 9 INJECTION, SOLUTION INTRAVENOUS at 15:28

## 2022-07-26 NOTE — BRIEF OPERATIVE NOTE - NSICDXBRIEFPREOP_GEN_ALL_CORE_FT
PRE-OP DIAGNOSIS:  Incisional hernia 26-Jul-2022 14:53:03  Francesco Luther  Left inguinal hernia 26-Jul-2022 14:52:54  Francesco Luther

## 2022-07-26 NOTE — BRIEF OPERATIVE NOTE - OPERATION/FINDINGS
left indirect inguinal hernia, progrip mesh    small fat containing incisional hernia repaired primarily

## 2022-07-26 NOTE — ASU DISCHARGE PLAN (ADULT/PEDIATRIC) - NS MD DC FALL RISK RISK
For information on Fall & Injury Prevention, visit: [email or web address] OR  [email or web address] OR  https://www.cdc.gov/steadi/patient.html

## 2022-07-26 NOTE — ASU PATIENT PROFILE, ADULT - FALL HARM RISK - HARM RISK INTERVENTIONS

## 2022-07-26 NOTE — BRIEF OPERATIVE NOTE - NSICDXBRIEFPROCEDURE_GEN_ALL_CORE_FT
PROCEDURES:  Robot-assisted repair of left inguinal hernia using da Rajesh Xi 26-Jul-2022 14:52:23  Franecsco Luther  Incisional herniorrhaphy 26-Jul-2022 14:52:40  Francesco Luther

## 2022-07-29 PROBLEM — E11.9 TYPE 2 DIABETES MELLITUS WITHOUT COMPLICATIONS: Chronic | Status: ACTIVE | Noted: 2022-07-20

## 2022-07-29 PROBLEM — K40.90 UNILATERAL INGUINAL HERNIA, WITHOUT OBSTRUCTION OR GANGRENE, NOT SPECIFIED AS RECURRENT: Chronic | Status: ACTIVE | Noted: 2022-07-20

## 2022-08-01 DIAGNOSIS — E78.5 HYPERLIPIDEMIA, UNSPECIFIED: ICD-10-CM

## 2022-08-01 DIAGNOSIS — Z85.46 PERSONAL HISTORY OF MALIGNANT NEOPLASM OF PROSTATE: ICD-10-CM

## 2022-08-01 DIAGNOSIS — K40.20 BILATERAL INGUINAL HERNIA, WITHOUT OBSTRUCTION OR GANGRENE, NOT SPECIFIED AS RECURRENT: ICD-10-CM

## 2022-08-01 DIAGNOSIS — Z87.891 PERSONAL HISTORY OF NICOTINE DEPENDENCE: ICD-10-CM

## 2022-08-01 DIAGNOSIS — E11.9 TYPE 2 DIABETES MELLITUS WITHOUT COMPLICATIONS: ICD-10-CM

## 2022-08-01 DIAGNOSIS — Z79.84 LONG TERM (CURRENT) USE OF ORAL HYPOGLYCEMIC DRUGS: ICD-10-CM

## 2022-08-01 DIAGNOSIS — K43.2 INCISIONAL HERNIA WITHOUT OBSTRUCTION OR GANGRENE: ICD-10-CM

## 2022-08-01 DIAGNOSIS — K66.0 PERITONEAL ADHESIONS (POSTPROCEDURAL) (POSTINFECTION): ICD-10-CM

## 2022-08-01 DIAGNOSIS — K40.90 UNILATERAL INGUINAL HERNIA, WITHOUT OBSTRUCTION OR GANGRENE, NOT SPECIFIED AS RECURRENT: ICD-10-CM

## 2022-08-01 DIAGNOSIS — D17.6 BENIGN LIPOMATOUS NEOPLASM OF SPERMATIC CORD: ICD-10-CM

## 2022-08-01 DIAGNOSIS — I10 ESSENTIAL (PRIMARY) HYPERTENSION: ICD-10-CM

## 2022-08-01 DIAGNOSIS — Z88.1 ALLERGY STATUS TO OTHER ANTIBIOTIC AGENTS STATUS: ICD-10-CM

## 2022-08-01 DIAGNOSIS — Z90.79 ACQUIRED ABSENCE OF OTHER GENITAL ORGAN(S): ICD-10-CM

## 2022-08-01 DIAGNOSIS — M10.9 GOUT, UNSPECIFIED: ICD-10-CM

## 2022-08-03 ENCOUNTER — APPOINTMENT (OUTPATIENT)
Dept: SURGERY | Facility: CLINIC | Age: 72
End: 2022-08-03

## 2022-08-03 VITALS
SYSTOLIC BLOOD PRESSURE: 151 MMHG | BODY MASS INDEX: 28.88 KG/M2 | WEIGHT: 225 LBS | TEMPERATURE: 97.7 F | OXYGEN SATURATION: 95 % | HEART RATE: 66 BPM | HEIGHT: 74 IN | DIASTOLIC BLOOD PRESSURE: 76 MMHG

## 2022-08-03 DIAGNOSIS — Z09 ENCOUNTER FOR FOLLOW-UP EXAMINATION AFTER COMPLETED TREATMENT FOR CONDITIONS OTHER THAN MALIGNANT NEOPLASM: ICD-10-CM

## 2022-08-03 PROCEDURE — 99024 POSTOP FOLLOW-UP VISIT: CPT

## 2022-08-03 NOTE — ASSESSMENT
[FreeTextEntry1] : Patient is well, has no complaints. Tolerating a diet. \par \par \par \par incisions are c/di healing, well.\par \par \par \par f/u 2 weeks

## 2022-08-18 ENCOUNTER — EMERGENCY (EMERGENCY)
Facility: HOSPITAL | Age: 72
LOS: 1 days | Discharge: HOME | End: 2022-08-18
Attending: EMERGENCY MEDICINE | Admitting: EMERGENCY MEDICINE

## 2022-08-18 VITALS
SYSTOLIC BLOOD PRESSURE: 136 MMHG | TEMPERATURE: 98 F | HEART RATE: 72 BPM | DIASTOLIC BLOOD PRESSURE: 97 MMHG | RESPIRATION RATE: 17 BRPM | OXYGEN SATURATION: 97 %

## 2022-08-18 VITALS
RESPIRATION RATE: 16 BRPM | TEMPERATURE: 98 F | HEIGHT: 74 IN | OXYGEN SATURATION: 100 % | SYSTOLIC BLOOD PRESSURE: 132 MMHG | HEART RATE: 75 BPM | DIASTOLIC BLOOD PRESSURE: 72 MMHG

## 2022-08-18 DIAGNOSIS — Z90.79 ACQUIRED ABSENCE OF OTHER GENITAL ORGAN(S): Chronic | ICD-10-CM

## 2022-08-18 DIAGNOSIS — Z98.890 OTHER SPECIFIED POSTPROCEDURAL STATES: Chronic | ICD-10-CM

## 2022-08-18 LAB
ALBUMIN SERPL ELPH-MCNC: 3.8 G/DL — SIGNIFICANT CHANGE UP (ref 3.3–5)
ALP SERPL-CCNC: 67 U/L — SIGNIFICANT CHANGE UP (ref 40–120)
ALT FLD-CCNC: 14 U/L — SIGNIFICANT CHANGE UP (ref 4–41)
ANION GAP SERPL CALC-SCNC: 11 MMOL/L — SIGNIFICANT CHANGE UP (ref 7–14)
AST SERPL-CCNC: 21 U/L — SIGNIFICANT CHANGE UP (ref 4–40)
BASOPHILS # BLD AUTO: 0.02 K/UL — SIGNIFICANT CHANGE UP (ref 0–0.2)
BASOPHILS NFR BLD AUTO: 0.2 % — SIGNIFICANT CHANGE UP (ref 0–2)
BILIRUB SERPL-MCNC: 0.5 MG/DL — SIGNIFICANT CHANGE UP (ref 0.2–1.2)
BUN SERPL-MCNC: 26 MG/DL — HIGH (ref 7–23)
CALCIUM SERPL-MCNC: 9.3 MG/DL — SIGNIFICANT CHANGE UP (ref 8.4–10.5)
CHLORIDE SERPL-SCNC: 99 MMOL/L — SIGNIFICANT CHANGE UP (ref 98–107)
CO2 SERPL-SCNC: 26 MMOL/L — SIGNIFICANT CHANGE UP (ref 22–31)
CREAT SERPL-MCNC: 1.77 MG/DL — HIGH (ref 0.5–1.3)
EGFR: 40 ML/MIN/1.73M2 — LOW
EOSINOPHIL # BLD AUTO: 0.24 K/UL — SIGNIFICANT CHANGE UP (ref 0–0.5)
EOSINOPHIL NFR BLD AUTO: 2.5 % — SIGNIFICANT CHANGE UP (ref 0–6)
GLUCOSE SERPL-MCNC: 129 MG/DL — HIGH (ref 70–99)
HCT VFR BLD CALC: 41.8 % — SIGNIFICANT CHANGE UP (ref 39–50)
HGB BLD-MCNC: 13.6 G/DL — SIGNIFICANT CHANGE UP (ref 13–17)
IANC: 7.05 K/UL — SIGNIFICANT CHANGE UP (ref 1.8–7.4)
IMM GRANULOCYTES NFR BLD AUTO: 0.3 % — SIGNIFICANT CHANGE UP (ref 0–1.5)
LIDOCAIN IGE QN: 56 U/L — SIGNIFICANT CHANGE UP (ref 7–60)
LYMPHOCYTES # BLD AUTO: 1.39 K/UL — SIGNIFICANT CHANGE UP (ref 1–3.3)
LYMPHOCYTES # BLD AUTO: 14.3 % — SIGNIFICANT CHANGE UP (ref 13–44)
MCHC RBC-ENTMCNC: 28.4 PG — SIGNIFICANT CHANGE UP (ref 27–34)
MCHC RBC-ENTMCNC: 32.5 GM/DL — SIGNIFICANT CHANGE UP (ref 32–36)
MCV RBC AUTO: 87.3 FL — SIGNIFICANT CHANGE UP (ref 80–100)
MONOCYTES # BLD AUTO: 1 K/UL — HIGH (ref 0–0.9)
MONOCYTES NFR BLD AUTO: 10.3 % — SIGNIFICANT CHANGE UP (ref 2–14)
NEUTROPHILS # BLD AUTO: 7.05 K/UL — SIGNIFICANT CHANGE UP (ref 1.8–7.4)
NEUTROPHILS NFR BLD AUTO: 72.4 % — SIGNIFICANT CHANGE UP (ref 43–77)
NRBC # BLD: 0 /100 WBCS — SIGNIFICANT CHANGE UP (ref 0–0)
NRBC # FLD: 0 K/UL — SIGNIFICANT CHANGE UP (ref 0–0)
PLATELET # BLD AUTO: 184 K/UL — SIGNIFICANT CHANGE UP (ref 150–400)
POTASSIUM SERPL-MCNC: 4.2 MMOL/L — SIGNIFICANT CHANGE UP (ref 3.5–5.3)
POTASSIUM SERPL-SCNC: 4.2 MMOL/L — SIGNIFICANT CHANGE UP (ref 3.5–5.3)
PROT SERPL-MCNC: 7 G/DL — SIGNIFICANT CHANGE UP (ref 6–8.3)
RBC # BLD: 4.79 M/UL — SIGNIFICANT CHANGE UP (ref 4.2–5.8)
RBC # FLD: 13.2 % — SIGNIFICANT CHANGE UP (ref 10.3–14.5)
SODIUM SERPL-SCNC: 136 MMOL/L — SIGNIFICANT CHANGE UP (ref 135–145)
WBC # BLD: 9.73 K/UL — SIGNIFICANT CHANGE UP (ref 3.8–10.5)
WBC # FLD AUTO: 9.73 K/UL — SIGNIFICANT CHANGE UP (ref 3.8–10.5)

## 2022-08-18 PROCEDURE — 74176 CT ABD & PELVIS W/O CONTRAST: CPT | Mod: 26,MA

## 2022-08-18 PROCEDURE — 99285 EMERGENCY DEPT VISIT HI MDM: CPT | Mod: FS

## 2022-08-18 RX ORDER — MORPHINE SULFATE 50 MG/1
4 CAPSULE, EXTENDED RELEASE ORAL ONCE
Refills: 0 | Status: DISCONTINUED | OUTPATIENT
Start: 2022-08-18 | End: 2022-08-18

## 2022-08-18 RX ADMIN — Medication 1 TABLET(S): at 13:59

## 2022-08-18 RX ADMIN — MORPHINE SULFATE 4 MILLIGRAM(S): 50 CAPSULE, EXTENDED RELEASE ORAL at 05:50

## 2022-08-18 NOTE — ED PROVIDER NOTE - NSICDXPASTSURGICALHX_GEN_ALL_CORE_FT
PAST SURGICAL HISTORY:  H/O inguinal hernia repair     H/O repair of left rotator cuff     History of abdominal prostatectomy     History of lithotripsy     History of robot-assisted laparoscopic radical prostatectomy

## 2022-08-18 NOTE — ED PROVIDER NOTE - OBJECTIVE STATEMENT
73 Y/O M PMH Gout, HTN, HLD, DM, Prostate CA S/P prostatectomy C/O abdominal pain. Pt endorses additional PSH of a R inguinal hernia repair (10 days ago [facility] Dr. Yazan Villalobos) states that he has had chills for the past day in additional to the pain, pt states he is passing gas, denies diarrhea. Pt declines pain medication. Pt denies any other sx or acute complaints.

## 2022-08-18 NOTE — ED PROVIDER NOTE - NS ED ATTENDING STATEMENT MOD
This was a shared visit with the GERA. I reviewed and verified the documentation and independently performed the documented:

## 2022-08-18 NOTE — ED ADULT TRIAGE NOTE - CHIEF COMPLAINT QUOTE
Pt had inguinal hernia repair three weeks ago comes in c/o generalized abdominal pain since 4 days ago. Denies fever or N/V/D but c/o chills. PMH of DM, HTN, prostate cancer

## 2022-08-18 NOTE — ED ADULT NURSE REASSESSMENT NOTE - NS ED NURSE REASSESS COMMENT FT1
Pt appears to be resting comfortably, NAD, respirations are even and unlabored, no complaints at this moment, Safety precautions implemented as per protocol, awaiting further MD orders, will continue to monitor.

## 2022-08-18 NOTE — ED ADULT NURSE NOTE - OBJECTIVE STATEMENT
Pt A&Ox4 ambulatory, PMH Inguinal hernia, Prostates CA, Gout, HTN, HLD, presenting to the ED (RM 24) c/o abdominal pain. Pt states started to experience abdominal pain x 4 days ago. Pt states pain is located LLQ, states thought it was gas or constipation and took laxatives. Pt states abdominal pain did not improve after laxatives. Pt states had inguinal hernia repair approx. three weeks ago. Endorses had chills yesterday. Denies cp, sob, palpitations, dizziness, lightheadedness, n/v/d, fever, cough, HA, blurry vision. Respirations are even and unlabored, VS noted, tenderness noted on LLQ, abdomen soft and nondistended, 18G IV RAC, labs sent, JULIETH Xie at bedside for evaluation, Safety precautions implemented as per protocol, awaiting further MD orders, will continue to monitor.

## 2022-08-18 NOTE — ED PROVIDER NOTE - NSFOLLOWUPINSTRUCTIONS_ED_ALL_ED_FT
You were seen in the ER for abdominal pain. Your lab results showed slightly decreased kidney function. Your CT scan showed diverticulitis. You received an antibiotic for this.     A prescription was sent to your pharmacy for the antibiotic. Please take it as instructed.    Please follow up with your primary care doctor about your kidney findings and for colonoscopy.    Please return to the ER if you have worsening symptoms including fever, chest pain, shortness of breath, abdominal pain, nausea, vomiting, diarrhea, weakness or lightheadedness/fainting.

## 2022-08-18 NOTE — ED PROVIDER NOTE - PATIENT PORTAL LINK FT
You can access the FollowMyHealth Patient Portal offered by [facility] by registering at the following website: [email or web address]. By joining [employer]’s FollowMyHealth portal, you will also be able to view your health information using other applications (apps) compatible with our system.

## 2022-08-18 NOTE — ED PROVIDER NOTE - CLINICAL SUMMARY MEDICAL DECISION MAKING FREE TEXT BOX
71 Y/O M PMH Gout, HTN, HLD, DM, Prostate CA S/P prostatectomy C/O abdominal pain. Pt endorses additional PSH of a R inguinal hernia repair (10 days ago [facility] Dr. Yazan Villalobos) states that he has had chills for the past day in additional to the pain, pt states he is passing gas, denies diarrhea. Plan is CT to eval for diverticulitis, colitis or other acute abdominal pathology, labs to eval for anemia or electrolyte disturbance.

## 2022-08-18 NOTE — ED PROVIDER NOTE - ATTENDING APP SHARED VISIT CONTRIBUTION OF CARE
73 yo with PMH HTN DM Gout HLD 10 days s/p R inguinal hernia repair presenting with one day of lower abd pain that does not radiate.  Sharp bloating sensation.  Still passing flatus and there is no NVD.  No fevers.      Gen: Well appearing in NAD  Head: NC/AT  Neck: trachea midline  Resp:  No distress  Abd: soft TTP LLQ and less so in the RLQ no rebound or guarding  Ext: no deformities  Neuro:  A&O appears non focal  Skin:  Warm and dry as visualized  Psych:  Normal affect and mood    73 yo with lower abd pain in the setting of recent surgery.  Differential includes but is not limited to divertic, appy, SBO, colitis.  Will need cross sectional imaging and labs to help differentiate.  Will reassess and dispo after workup.  Pt declining pain meds at this time.  *The above represents an initial assessment/impression. Please refer to progress notes for potential changes in patient clinical course*

## 2022-08-18 NOTE — ED PROVIDER NOTE - PROGRESS NOTE DETAILS
Lucrecia Dunn- pt feeling better. awaiting ct Lucrecia Dunn- pt feels abd pain better. ct shows diverticulitis. augmentin given. pt notified about ct results, advised to f/u with pcp for colonoscopy and for renal findings. advised to f/u with Dr. Villalobos. pt agrees.

## 2022-08-18 NOTE — ED PROVIDER NOTE - PSYCHIATRIC, MLM
Alert and oriented to person, place, time/situation. normal mood and affect. no apparent risk to self or others.
I, Tone Ledezma, performed the initial face to face bedside interview with this patient regarding history of present illness, review of symptoms and relevant past medical, social and family history.  I completed an independent physical examination.  I was the initial provider who evaluated this patient. I have signed out the follow up of any pending tests (i.e. labs, radiological studies) to the ACP.  I have communicated the patient’s plan of care and disposition with the ACP.  The history, relevant review of systems, past medical and surgical history, medical decision making, and physical examination was documented by the scribe in my presence and I attest to the accuracy of the documentation.

## 2022-08-18 NOTE — ED ADULT NURSE NOTE - NSIMPLEMENTINTERV_GEN_ALL_ED
Implemented All Universal Safety Interventions:  [place] to call system. Call bell, personal items and telephone within reach. Instruct patient to call for assistance. Room bathroom lighting operational. Non-slip footwear when patient is off stretcher. Physically safe environment: no spills, clutter or unnecessary equipment. Stretcher in lowest position, wheels locked, appropriate side rails in place.

## 2022-08-22 ENCOUNTER — APPOINTMENT (OUTPATIENT)
Dept: SURGERY | Facility: CLINIC | Age: 72
End: 2022-08-22

## 2022-08-22 VITALS
SYSTOLIC BLOOD PRESSURE: 133 MMHG | WEIGHT: 230 LBS | BODY MASS INDEX: 29.52 KG/M2 | HEIGHT: 74 IN | DIASTOLIC BLOOD PRESSURE: 66 MMHG | TEMPERATURE: 96.7 F | HEART RATE: 53 BPM

## 2022-08-22 PROCEDURE — 99024 POSTOP FOLLOW-UP VISIT: CPT

## 2022-10-30 ENCOUNTER — INPATIENT (INPATIENT)
Facility: HOSPITAL | Age: 72
LOS: 1 days | Discharge: HOME | End: 2022-11-01
Attending: INTERNAL MEDICINE | Admitting: INTERNAL MEDICINE

## 2022-10-30 VITALS
SYSTOLIC BLOOD PRESSURE: 152 MMHG | HEIGHT: 74 IN | TEMPERATURE: 98 F | RESPIRATION RATE: 14 BRPM | DIASTOLIC BLOOD PRESSURE: 78 MMHG | OXYGEN SATURATION: 100 % | HEART RATE: 64 BPM

## 2022-10-30 DIAGNOSIS — Z98.890 OTHER SPECIFIED POSTPROCEDURAL STATES: Chronic | ICD-10-CM

## 2022-10-30 DIAGNOSIS — Z90.79 ACQUIRED ABSENCE OF OTHER GENITAL ORGAN(S): Chronic | ICD-10-CM

## 2022-10-30 LAB
ALBUMIN SERPL ELPH-MCNC: 4.1 G/DL — SIGNIFICANT CHANGE UP (ref 3.3–5)
ALP SERPL-CCNC: 73 U/L — SIGNIFICANT CHANGE UP (ref 40–120)
ALT FLD-CCNC: 15 U/L — SIGNIFICANT CHANGE UP (ref 4–41)
ANION GAP SERPL CALC-SCNC: 10 MMOL/L — SIGNIFICANT CHANGE UP (ref 7–14)
ANION GAP SERPL CALC-SCNC: 9 MMOL/L — SIGNIFICANT CHANGE UP (ref 7–14)
APPEARANCE UR: CLEAR — SIGNIFICANT CHANGE UP
AST SERPL-CCNC: 34 U/L — SIGNIFICANT CHANGE UP (ref 4–40)
BASOPHILS # BLD AUTO: 0.02 K/UL — SIGNIFICANT CHANGE UP (ref 0–0.2)
BASOPHILS NFR BLD AUTO: 0.3 % — SIGNIFICANT CHANGE UP (ref 0–2)
BILIRUB SERPL-MCNC: 0.4 MG/DL — SIGNIFICANT CHANGE UP (ref 0.2–1.2)
BILIRUB UR-MCNC: NEGATIVE — SIGNIFICANT CHANGE UP
BLOOD GAS VENOUS COMPREHENSIVE RESULT: SIGNIFICANT CHANGE UP
BUN SERPL-MCNC: 26 MG/DL — HIGH (ref 7–23)
BUN SERPL-MCNC: 26 MG/DL — HIGH (ref 7–23)
CALCIUM SERPL-MCNC: 9.3 MG/DL — SIGNIFICANT CHANGE UP (ref 8.4–10.5)
CALCIUM SERPL-MCNC: 9.8 MG/DL — SIGNIFICANT CHANGE UP (ref 8.4–10.5)
CHLORIDE SERPL-SCNC: 103 MMOL/L — SIGNIFICANT CHANGE UP (ref 98–107)
CHLORIDE SERPL-SCNC: 103 MMOL/L — SIGNIFICANT CHANGE UP (ref 98–107)
CO2 SERPL-SCNC: 24 MMOL/L — SIGNIFICANT CHANGE UP (ref 22–31)
CO2 SERPL-SCNC: 26 MMOL/L — SIGNIFICANT CHANGE UP (ref 22–31)
COLOR SPEC: SIGNIFICANT CHANGE UP
CREAT SERPL-MCNC: 1.62 MG/DL — HIGH (ref 0.5–1.3)
CREAT SERPL-MCNC: 1.64 MG/DL — HIGH (ref 0.5–1.3)
DIFF PNL FLD: NEGATIVE — SIGNIFICANT CHANGE UP
EGFR: 44 ML/MIN/1.73M2 — LOW
EGFR: 44 ML/MIN/1.73M2 — LOW
EOSINOPHIL # BLD AUTO: 0.26 K/UL — SIGNIFICANT CHANGE UP (ref 0–0.5)
EOSINOPHIL NFR BLD AUTO: 3.5 % — SIGNIFICANT CHANGE UP (ref 0–6)
GLUCOSE SERPL-MCNC: 101 MG/DL — HIGH (ref 70–99)
GLUCOSE SERPL-MCNC: 96 MG/DL — SIGNIFICANT CHANGE UP (ref 70–99)
GLUCOSE UR QL: NEGATIVE — SIGNIFICANT CHANGE UP
HCT VFR BLD CALC: 46.9 % — SIGNIFICANT CHANGE UP (ref 39–50)
HGB BLD-MCNC: 15.1 G/DL — SIGNIFICANT CHANGE UP (ref 13–17)
IANC: 4.95 K/UL — SIGNIFICANT CHANGE UP (ref 1.8–7.4)
IMM GRANULOCYTES NFR BLD AUTO: 0.3 % — SIGNIFICANT CHANGE UP (ref 0–0.9)
KETONES UR-MCNC: NEGATIVE — SIGNIFICANT CHANGE UP
LEUKOCYTE ESTERASE UR-ACNC: NEGATIVE — SIGNIFICANT CHANGE UP
LYMPHOCYTES # BLD AUTO: 1.44 K/UL — SIGNIFICANT CHANGE UP (ref 1–3.3)
LYMPHOCYTES # BLD AUTO: 19.1 % — SIGNIFICANT CHANGE UP (ref 13–44)
MCHC RBC-ENTMCNC: 28.5 PG — SIGNIFICANT CHANGE UP (ref 27–34)
MCHC RBC-ENTMCNC: 32.2 GM/DL — SIGNIFICANT CHANGE UP (ref 32–36)
MCV RBC AUTO: 88.7 FL — SIGNIFICANT CHANGE UP (ref 80–100)
MONOCYTES # BLD AUTO: 0.84 K/UL — SIGNIFICANT CHANGE UP (ref 0–0.9)
MONOCYTES NFR BLD AUTO: 11.2 % — SIGNIFICANT CHANGE UP (ref 2–14)
NEUTROPHILS # BLD AUTO: 4.95 K/UL — SIGNIFICANT CHANGE UP (ref 1.8–7.4)
NEUTROPHILS NFR BLD AUTO: 65.6 % — SIGNIFICANT CHANGE UP (ref 43–77)
NITRITE UR-MCNC: NEGATIVE — SIGNIFICANT CHANGE UP
NRBC # BLD: 0 /100 WBCS — SIGNIFICANT CHANGE UP (ref 0–0)
NRBC # FLD: 0 K/UL — SIGNIFICANT CHANGE UP (ref 0–0)
PH UR: 6.5 — SIGNIFICANT CHANGE UP (ref 5–8)
PLATELET # BLD AUTO: 211 K/UL — SIGNIFICANT CHANGE UP (ref 150–400)
POTASSIUM SERPL-MCNC: 5.5 MMOL/L — HIGH (ref 3.5–5.3)
POTASSIUM SERPL-MCNC: 5.8 MMOL/L — HIGH (ref 3.5–5.3)
POTASSIUM SERPL-SCNC: 5.5 MMOL/L — HIGH (ref 3.5–5.3)
POTASSIUM SERPL-SCNC: 5.8 MMOL/L — HIGH (ref 3.5–5.3)
PROT SERPL-MCNC: 7.5 G/DL — SIGNIFICANT CHANGE UP (ref 6–8.3)
PROT UR-MCNC: ABNORMAL
RBC # BLD: 5.29 M/UL — SIGNIFICANT CHANGE UP (ref 4.2–5.8)
RBC # FLD: 14.1 % — SIGNIFICANT CHANGE UP (ref 10.3–14.5)
SODIUM SERPL-SCNC: 137 MMOL/L — SIGNIFICANT CHANGE UP (ref 135–145)
SODIUM SERPL-SCNC: 138 MMOL/L — SIGNIFICANT CHANGE UP (ref 135–145)
SP GR SPEC: 1.01 — SIGNIFICANT CHANGE UP (ref 1.01–1.05)
UROBILINOGEN FLD QL: SIGNIFICANT CHANGE UP
WBC # BLD: 7.53 K/UL — SIGNIFICANT CHANGE UP (ref 3.8–10.5)
WBC # FLD AUTO: 7.53 K/UL — SIGNIFICANT CHANGE UP (ref 3.8–10.5)

## 2022-10-30 PROCEDURE — 99285 EMERGENCY DEPT VISIT HI MDM: CPT | Mod: FS

## 2022-10-30 PROCEDURE — 74177 CT ABD & PELVIS W/CONTRAST: CPT | Mod: 26,MA

## 2022-10-30 RX ORDER — IPRATROPIUM/ALBUTEROL SULFATE 18-103MCG
3 AEROSOL WITH ADAPTER (GRAM) INHALATION ONCE
Refills: 0 | Status: COMPLETED | OUTPATIENT
Start: 2022-10-30 | End: 2022-10-30

## 2022-10-30 RX ORDER — CALCIUM GLUCONATE 100 MG/ML
2 VIAL (ML) INTRAVENOUS ONCE
Refills: 0 | Status: COMPLETED | OUTPATIENT
Start: 2022-10-30 | End: 2022-10-30

## 2022-10-30 RX ORDER — SODIUM CHLORIDE 9 MG/ML
1000 INJECTION INTRAMUSCULAR; INTRAVENOUS; SUBCUTANEOUS ONCE
Refills: 0 | Status: COMPLETED | OUTPATIENT
Start: 2022-10-30 | End: 2022-10-30

## 2022-10-30 RX ORDER — MORPHINE SULFATE 50 MG/1
4 CAPSULE, EXTENDED RELEASE ORAL ONCE
Refills: 0 | Status: DISCONTINUED | OUTPATIENT
Start: 2022-10-30 | End: 2022-10-30

## 2022-10-30 RX ADMIN — Medication 3 MILLILITER(S): at 23:17

## 2022-10-30 RX ADMIN — SODIUM CHLORIDE 1000 MILLILITER(S): 9 INJECTION INTRAMUSCULAR; INTRAVENOUS; SUBCUTANEOUS at 20:11

## 2022-10-30 RX ADMIN — MORPHINE SULFATE 4 MILLIGRAM(S): 50 CAPSULE, EXTENDED RELEASE ORAL at 20:11

## 2022-10-30 RX ADMIN — MORPHINE SULFATE 4 MILLIGRAM(S): 50 CAPSULE, EXTENDED RELEASE ORAL at 20:30

## 2022-10-30 RX ADMIN — Medication 200 GRAM(S): at 23:32

## 2022-10-30 RX ADMIN — Medication 3 MILLILITER(S): at 23:18

## 2022-10-30 NOTE — ED PROVIDER NOTE - OBJECTIVE STATEMENT
73 y/o male pmh htn, hld, dm, gout, diverticulitis, last visit ~ 6 wks ago for diverticulitis, sent home on PO meds, p/w c/o LLQ abdominal pain, nonraidating,  8/10, constant x 2 days, feels like previous episode, denies any HA, neck pain, cough, f/c/n/v/d, chest pain, sob, urinary symptoms, numbness/weakness/tingling, reecnt travel, sick contact, social history

## 2022-10-30 NOTE — ED PROVIDER NOTE - ATTENDING APP SHARED VISIT CONTRIBUTION OF CARE
Felicia: I have seen and examined the patient face to face, have reviewed and addended the HPI, PE and a/p as necessary.       73 yo M with HTN, HLD, DM gout, diverticulitis last visit 6 weeks ago, discharge home with PO meds a/w recurrent LLQ pain.  Reports pain has been constant for 2 days.  No fever/chills, No photophobia/eye pain/changes in vision, No ear pain/sore throat/dysphagia, No chest pain/palpitations, no SOB/cough/wheeze/stridor, No N/V/D, no dysuria/frequency/discharge, No neck/back pain, no rash, no changes in neurological status/function.     GEN - NAD; well appearing; A+O x3; non-toxic appearing  CARD -s1s2, RRR, no M,G,R;   PULM - CTA b/l, symmetric breath sounds;   ABD -  +BS, TTP in LLQ, soft, no guarding, no rebound, no masses;   BACK - no CVA tenderness, Normal  spine;   EXT - symmetric pulses, 2+ dp, capillary refill < 2 seconds, no cyanosis, no edema;   NEURO - no focal neuro deficits, no slurred speech    concern for recurrent diverticulitis, r/o abscess vs perforation. check ct a/p, cbc, cmp and reassess.  POssible admission for pain control and iv abx, pending ct results.

## 2022-10-30 NOTE — ED ADULT NURSE NOTE - CHIEF COMPLAINT QUOTE
pt c/o 2 days left lower quadrant pain denies n/v/d. PMH: htn, hld, gout, diverticulitis, dm2 fq=[phone]

## 2022-10-30 NOTE — ED ADULT TRIAGE NOTE - CHIEF COMPLAINT QUOTE
pt c/o 2 days left lower quadrant pain denies n/v/d. PMH: htn, hld, gout, diverticulitis, dm2 cc=[phone]

## 2022-10-30 NOTE — ED ADULT NURSE NOTE - OBJECTIVE STATEMENT
Received pt in room 25. A&Ox4, ambulatory at baseline, c/o LLQ abdominal pain x2days. denies n/v/d. PMH: htn, hld, gout, diverticulitis, dm2. nonraidating,  8/10. States feels like previous episode. No bruising on abdomen, normoactive bowel sounds, tender to palpation. Denies CP, SOB, N+V, diarrhea, headache, dizziness, fever, chills, exposure to sick, bowel/bladder changes. VSS. RR even and unlabored. 20g placed in right AC. Labs sent. Medication given. Awaiting further orders from provider.

## 2022-10-30 NOTE — ED PROVIDER NOTE - CLINICAL SUMMARY MEDICAL DECISION MAKING FREE TEXT BOX
73 y/o male pmh htn, hld, dm, gout, diverticulitis, last visit ~ 6 wks ago for diverticulitis, sent home on PO meds, p/w c/o LLQ abdominal pain, nonraidating,  8/10, constant x 2 days, feels like previous episode, denies any HA, neck pain, cough, f/c/n/v/d, chest pain, sob, urinary symptoms, numbness/weakness/tingling, reecnt travel, sick contact, social history  on exam : + LLQ tenderness  labs, meds, fluids, ct r/o diverticulitis vs perf vs abscess

## 2022-10-31 ENCOUNTER — TRANSCRIPTION ENCOUNTER (OUTPATIENT)
Age: 72
End: 2022-10-31

## 2022-10-31 DIAGNOSIS — Z29.9 ENCOUNTER FOR PROPHYLACTIC MEASURES, UNSPECIFIED: ICD-10-CM

## 2022-10-31 DIAGNOSIS — E78.5 HYPERLIPIDEMIA, UNSPECIFIED: ICD-10-CM

## 2022-10-31 DIAGNOSIS — M10.9 GOUT, UNSPECIFIED: ICD-10-CM

## 2022-10-31 DIAGNOSIS — K57.92 DIVERTICULITIS OF INTESTINE, PART UNSPECIFIED, WITHOUT PERFORATION OR ABSCESS WITHOUT BLEEDING: ICD-10-CM

## 2022-10-31 DIAGNOSIS — E87.5 HYPERKALEMIA: ICD-10-CM

## 2022-10-31 DIAGNOSIS — N18.9 CHRONIC KIDNEY DISEASE, UNSPECIFIED: ICD-10-CM

## 2022-10-31 DIAGNOSIS — I10 ESSENTIAL (PRIMARY) HYPERTENSION: ICD-10-CM

## 2022-10-31 DIAGNOSIS — E11.9 TYPE 2 DIABETES MELLITUS WITHOUT COMPLICATIONS: ICD-10-CM

## 2022-10-31 LAB
A1C WITH ESTIMATED AVERAGE GLUCOSE RESULT: 6.3 % — HIGH (ref 4–5.6)
ALBUMIN SERPL ELPH-MCNC: 3.8 G/DL — SIGNIFICANT CHANGE UP (ref 3.3–5)
ALP SERPL-CCNC: 71 U/L — SIGNIFICANT CHANGE UP (ref 40–120)
ALT FLD-CCNC: 15 U/L — SIGNIFICANT CHANGE UP (ref 4–41)
ANION GAP SERPL CALC-SCNC: 11 MMOL/L — SIGNIFICANT CHANGE UP (ref 7–14)
AST SERPL-CCNC: 21 U/L — SIGNIFICANT CHANGE UP (ref 4–40)
BILIRUB SERPL-MCNC: 0.6 MG/DL — SIGNIFICANT CHANGE UP (ref 0.2–1.2)
BUN SERPL-MCNC: 22 MG/DL — SIGNIFICANT CHANGE UP (ref 7–23)
CALCIUM SERPL-MCNC: 9.6 MG/DL — SIGNIFICANT CHANGE UP (ref 8.4–10.5)
CHLORIDE SERPL-SCNC: 98 MMOL/L — SIGNIFICANT CHANGE UP (ref 98–107)
CO2 SERPL-SCNC: 25 MMOL/L — SIGNIFICANT CHANGE UP (ref 22–31)
CREAT SERPL-MCNC: 1.51 MG/DL — HIGH (ref 0.5–1.3)
EGFR: 48 ML/MIN/1.73M2 — LOW
ESTIMATED AVERAGE GLUCOSE: 134 — SIGNIFICANT CHANGE UP
FLUAV AG NPH QL: SIGNIFICANT CHANGE UP
FLUBV AG NPH QL: SIGNIFICANT CHANGE UP
GLUCOSE BLDC GLUCOMTR-MCNC: 106 MG/DL — HIGH (ref 70–99)
GLUCOSE BLDC GLUCOMTR-MCNC: 108 MG/DL — HIGH (ref 70–99)
GLUCOSE BLDC GLUCOMTR-MCNC: 110 MG/DL — HIGH (ref 70–99)
GLUCOSE BLDC GLUCOMTR-MCNC: 111 MG/DL — HIGH (ref 70–99)
GLUCOSE BLDC GLUCOMTR-MCNC: 133 MG/DL — HIGH (ref 70–99)
GLUCOSE SERPL-MCNC: 115 MG/DL — HIGH (ref 70–99)
HCT VFR BLD CALC: 40.9 % — SIGNIFICANT CHANGE UP (ref 39–50)
HGB BLD-MCNC: 13.4 G/DL — SIGNIFICANT CHANGE UP (ref 13–17)
LACTATE SERPL-SCNC: 2.6 MMOL/L — HIGH (ref 0.5–2)
MAGNESIUM SERPL-MCNC: 1.8 MG/DL — SIGNIFICANT CHANGE UP (ref 1.6–2.6)
MCHC RBC-ENTMCNC: 28.4 PG — SIGNIFICANT CHANGE UP (ref 27–34)
MCHC RBC-ENTMCNC: 32.8 GM/DL — SIGNIFICANT CHANGE UP (ref 32–36)
MCV RBC AUTO: 86.7 FL — SIGNIFICANT CHANGE UP (ref 80–100)
NRBC # BLD: 0 /100 WBCS — SIGNIFICANT CHANGE UP (ref 0–0)
NRBC # FLD: 0 K/UL — SIGNIFICANT CHANGE UP (ref 0–0)
PHOSPHATE SERPL-MCNC: 4.4 MG/DL — SIGNIFICANT CHANGE UP (ref 2.5–4.5)
PLATELET # BLD AUTO: 180 K/UL — SIGNIFICANT CHANGE UP (ref 150–400)
POTASSIUM SERPL-MCNC: 4.3 MMOL/L — SIGNIFICANT CHANGE UP (ref 3.5–5.3)
POTASSIUM SERPL-SCNC: 4.3 MMOL/L — SIGNIFICANT CHANGE UP (ref 3.5–5.3)
PROT SERPL-MCNC: 6.8 G/DL — SIGNIFICANT CHANGE UP (ref 6–8.3)
RBC # BLD: 4.72 M/UL — SIGNIFICANT CHANGE UP (ref 4.2–5.8)
RBC # FLD: 14.1 % — SIGNIFICANT CHANGE UP (ref 10.3–14.5)
RSV RNA NPH QL NAA+NON-PROBE: SIGNIFICANT CHANGE UP
SARS-COV-2 RNA SPEC QL NAA+PROBE: SIGNIFICANT CHANGE UP
SODIUM SERPL-SCNC: 134 MMOL/L — LOW (ref 135–145)
WBC # BLD: 7.87 K/UL — SIGNIFICANT CHANGE UP (ref 3.8–10.5)
WBC # FLD AUTO: 7.87 K/UL — SIGNIFICANT CHANGE UP (ref 3.8–10.5)

## 2022-10-31 PROCEDURE — 99223 1ST HOSP IP/OBS HIGH 75: CPT

## 2022-10-31 RX ORDER — MELOXICAM 15 MG/1
1 TABLET ORAL
Qty: 0 | Refills: 0 | DISCHARGE

## 2022-10-31 RX ORDER — OMEGA-3 ACID ETHYL ESTERS 1 G
4 CAPSULE ORAL DAILY
Refills: 0 | Status: DISCONTINUED | OUTPATIENT
Start: 2022-10-31 | End: 2022-11-01

## 2022-10-31 RX ORDER — ACETAMINOPHEN 500 MG
650 TABLET ORAL EVERY 6 HOURS
Refills: 0 | Status: DISCONTINUED | OUTPATIENT
Start: 2022-10-31 | End: 2022-10-31

## 2022-10-31 RX ORDER — MORPHINE SULFATE 50 MG/1
2 CAPSULE, EXTENDED RELEASE ORAL EVERY 8 HOURS
Refills: 0 | Status: DISCONTINUED | OUTPATIENT
Start: 2022-10-31 | End: 2022-10-31

## 2022-10-31 RX ORDER — SODIUM CHLORIDE 9 MG/ML
1000 INJECTION, SOLUTION INTRAVENOUS
Refills: 0 | Status: DISCONTINUED | OUTPATIENT
Start: 2022-10-31 | End: 2022-11-01

## 2022-10-31 RX ORDER — ACETAMINOPHEN 500 MG
650 TABLET ORAL EVERY 6 HOURS
Refills: 0 | Status: DISCONTINUED | OUTPATIENT
Start: 2022-10-31 | End: 2022-11-01

## 2022-10-31 RX ORDER — DEXTROSE 50 % IN WATER 50 %
15 SYRINGE (ML) INTRAVENOUS ONCE
Refills: 0 | Status: DISCONTINUED | OUTPATIENT
Start: 2022-10-31 | End: 2022-11-01

## 2022-10-31 RX ORDER — CHOLECALCIFEROL (VITAMIN D3) 125 MCG
1 CAPSULE ORAL
Qty: 0 | Refills: 0 | DISCHARGE

## 2022-10-31 RX ORDER — PIPERACILLIN AND TAZOBACTAM 4; .5 G/20ML; G/20ML
3.38 INJECTION, POWDER, LYOPHILIZED, FOR SOLUTION INTRAVENOUS ONCE
Refills: 0 | Status: DISCONTINUED | OUTPATIENT
Start: 2022-10-31 | End: 2022-10-31

## 2022-10-31 RX ORDER — ATORVASTATIN CALCIUM 80 MG/1
1 TABLET, FILM COATED ORAL
Qty: 0 | Refills: 0 | DISCHARGE

## 2022-10-31 RX ORDER — ATORVASTATIN CALCIUM 80 MG/1
40 TABLET, FILM COATED ORAL AT BEDTIME
Refills: 0 | Status: DISCONTINUED | OUTPATIENT
Start: 2022-10-31 | End: 2022-11-01

## 2022-10-31 RX ORDER — ALLOPURINOL 300 MG
100 TABLET ORAL DAILY
Refills: 0 | Status: DISCONTINUED | OUTPATIENT
Start: 2022-10-31 | End: 2022-11-01

## 2022-10-31 RX ORDER — PIPERACILLIN AND TAZOBACTAM 4; .5 G/20ML; G/20ML
3.38 INJECTION, POWDER, LYOPHILIZED, FOR SOLUTION INTRAVENOUS ONCE
Refills: 0 | Status: COMPLETED | OUTPATIENT
Start: 2022-10-31 | End: 2022-10-31

## 2022-10-31 RX ORDER — GLUCAGON INJECTION, SOLUTION 0.5 MG/.1ML
1 INJECTION, SOLUTION SUBCUTANEOUS ONCE
Refills: 0 | Status: DISCONTINUED | OUTPATIENT
Start: 2022-10-31 | End: 2022-11-01

## 2022-10-31 RX ORDER — METRONIDAZOLE 500 MG
500 TABLET ORAL ONCE
Refills: 0 | Status: COMPLETED | OUTPATIENT
Start: 2022-10-31 | End: 2022-10-31

## 2022-10-31 RX ORDER — DEXTROSE 50 % IN WATER 50 %
12.5 SYRINGE (ML) INTRAVENOUS ONCE
Refills: 0 | Status: DISCONTINUED | OUTPATIENT
Start: 2022-10-31 | End: 2022-11-01

## 2022-10-31 RX ORDER — DEXTROSE 50 % IN WATER 50 %
25 SYRINGE (ML) INTRAVENOUS ONCE
Refills: 0 | Status: DISCONTINUED | OUTPATIENT
Start: 2022-10-31 | End: 2022-11-01

## 2022-10-31 RX ORDER — COLCHICINE 0.6 MG
1 TABLET ORAL
Qty: 0 | Refills: 0 | DISCHARGE

## 2022-10-31 RX ORDER — AMLODIPINE BESYLATE 2.5 MG/1
10 TABLET ORAL DAILY
Refills: 0 | Status: DISCONTINUED | OUTPATIENT
Start: 2022-10-31 | End: 2022-11-01

## 2022-10-31 RX ORDER — LANOLIN ALCOHOL/MO/W.PET/CERES
3 CREAM (GRAM) TOPICAL AT BEDTIME
Refills: 0 | Status: DISCONTINUED | OUTPATIENT
Start: 2022-10-31 | End: 2022-11-01

## 2022-10-31 RX ORDER — SODIUM CHLORIDE 9 MG/ML
1000 INJECTION INTRAMUSCULAR; INTRAVENOUS; SUBCUTANEOUS
Refills: 0 | Status: DISCONTINUED | OUTPATIENT
Start: 2022-10-31 | End: 2022-11-01

## 2022-10-31 RX ORDER — PIPERACILLIN AND TAZOBACTAM 4; .5 G/20ML; G/20ML
3.38 INJECTION, POWDER, LYOPHILIZED, FOR SOLUTION INTRAVENOUS EVERY 8 HOURS
Refills: 0 | Status: DISCONTINUED | OUTPATIENT
Start: 2022-10-31 | End: 2022-11-01

## 2022-10-31 RX ORDER — ALLOPURINOL 300 MG
1 TABLET ORAL
Qty: 0 | Refills: 0 | DISCHARGE

## 2022-10-31 RX ORDER — INSULIN LISPRO 100/ML
VIAL (ML) SUBCUTANEOUS
Refills: 0 | Status: DISCONTINUED | OUTPATIENT
Start: 2022-10-31 | End: 2022-11-01

## 2022-10-31 RX ORDER — PREGABALIN 225 MG/1
1000 CAPSULE ORAL DAILY
Refills: 0 | Status: DISCONTINUED | OUTPATIENT
Start: 2022-10-31 | End: 2022-11-01

## 2022-10-31 RX ORDER — LATANOPROSTENE BUNOD 0.24 MG/ML
1 SOLUTION/ DROPS OPHTHALMIC
Qty: 0 | Refills: 0 | DISCHARGE

## 2022-10-31 RX ORDER — INSULIN LISPRO 100/ML
VIAL (ML) SUBCUTANEOUS AT BEDTIME
Refills: 0 | Status: DISCONTINUED | OUTPATIENT
Start: 2022-10-31 | End: 2022-11-01

## 2022-10-31 RX ORDER — SODIUM ZIRCONIUM CYCLOSILICATE 10 G/10G
10 POWDER, FOR SUSPENSION ORAL ONCE
Refills: 0 | Status: COMPLETED | OUTPATIENT
Start: 2022-10-31 | End: 2022-10-31

## 2022-10-31 RX ORDER — OLMESARTAN MEDOXOMIL 5 MG/1
1 TABLET, FILM COATED ORAL
Qty: 0 | Refills: 0 | DISCHARGE

## 2022-10-31 RX ORDER — ENOXAPARIN SODIUM 100 MG/ML
40 INJECTION SUBCUTANEOUS EVERY 24 HOURS
Refills: 0 | Status: DISCONTINUED | OUTPATIENT
Start: 2022-10-31 | End: 2022-11-01

## 2022-10-31 RX ADMIN — PREGABALIN 1000 MICROGRAM(S): 225 CAPSULE ORAL at 11:28

## 2022-10-31 RX ADMIN — AMLODIPINE BESYLATE 10 MILLIGRAM(S): 2.5 TABLET ORAL at 07:52

## 2022-10-31 RX ADMIN — SODIUM ZIRCONIUM CYCLOSILICATE 10 GRAM(S): 10 POWDER, FOR SUSPENSION ORAL at 01:05

## 2022-10-31 RX ADMIN — Medication 1 TABLET(S): at 11:28

## 2022-10-31 RX ADMIN — ATORVASTATIN CALCIUM 40 MILLIGRAM(S): 80 TABLET, FILM COATED ORAL at 21:30

## 2022-10-31 RX ADMIN — SODIUM CHLORIDE 75 MILLILITER(S): 9 INJECTION INTRAMUSCULAR; INTRAVENOUS; SUBCUTANEOUS at 04:30

## 2022-10-31 RX ADMIN — Medication 1 TABLET(S): at 02:17

## 2022-10-31 RX ADMIN — PIPERACILLIN AND TAZOBACTAM 25 GRAM(S): 4; .5 INJECTION, POWDER, LYOPHILIZED, FOR SOLUTION INTRAVENOUS at 11:37

## 2022-10-31 RX ADMIN — Medication 100 MILLIGRAM(S): at 11:29

## 2022-10-31 RX ADMIN — Medication 100 MILLIGRAM(S): at 01:08

## 2022-10-31 RX ADMIN — Medication 4 GRAM(S): at 11:35

## 2022-10-31 RX ADMIN — ENOXAPARIN SODIUM 40 MILLIGRAM(S): 100 INJECTION SUBCUTANEOUS at 11:28

## 2022-10-31 RX ADMIN — PIPERACILLIN AND TAZOBACTAM 25 GRAM(S): 4; .5 INJECTION, POWDER, LYOPHILIZED, FOR SOLUTION INTRAVENOUS at 20:41

## 2022-10-31 RX ADMIN — PIPERACILLIN AND TAZOBACTAM 200 GRAM(S): 4; .5 INJECTION, POWDER, LYOPHILIZED, FOR SOLUTION INTRAVENOUS at 04:32

## 2022-10-31 NOTE — H&P ADULT - NSHPSOCIALHISTORY_GEN_ALL_CORE
lives in [place].  former smoker; quit in 1980s.  social drinker 1 glass/week.  no illicit drug use.

## 2022-10-31 NOTE — H&P ADULT - HISTORY OF PRESENT ILLNESS
MEHREEN PUGH is a 72y Male with PMH significant for HTN, HLD, DM 2, gout, prostate cancer s/p prostatectomy, R inguinal hernia repair 7/2022 presenting for left lower quadrant abdominal pain. Patient reports constant --, nonradiating, LLQ abdominal pain at 8/10 severity for the past 2 days. Denies any melena, hematochezia. Was in the ED recently on 8/2022 with similar symptoms and diagnosed with acute diverticulitis and sent home with augmentin and recommendations to follow up with GI for a colonoscopy. States this episode feels similar to previous episode. Otherwise, denies --.    ED Course:  Vital Signs on Presentation:  Temp: 98.2 F (36.8 C) | BP: 15/78 mmHg | HR: 64 | RR: 14 | SpO2: 100% on RA  Medications Given: morphine 4 mg, calcium gluconate 2 g, albuterol 3 ml x 3, NS 1L bolus     MEHREEN PUGH is a 72y Male with PMH significant for HTN, HLD, DM 2, CKD, gout, prostate cancer s/p prostatectomy, R inguinal hernia repair 7/2022 presenting for left lower quadrant abdominal pain. Patient reports feeling constant sense of "discomfort" in the LLQ starting 2 days ago associated with minimal pain. States that during this period, he was having normal BM with ability to pass gas; otherwise no N/V. Reports worsening of symptoms on Sunday evening prior to presentation with constant, sharp, nonradiating, LLQ abdominal pain at 8/10 severity. Reports having BM an hour before presentation; describes it as normal consistency brown colored stool. Denies any diarrhea, melena, or hematochezia. Passing gas normally with no N/V or changes in appetite. Was in the ED recently on 8/2022 with similar symptoms and diagnosed with acute diverticulitis and sent home with augmentin and recommendations to follow up with GI for a colonoscopy. States this episode feels similar to previous episode. Otherwise, denies fevers, chills, CP, SOB. Denies any changes to diet recently, travel or sick contacts. Last colonoscopy 14 years ago, scheduled for Nov 7 with Dr. Benson.    ED Course:  Vital Signs on Presentation:  Temp: 98.2 F (36.8 C) | BP: 15/78 mmHg | HR: 64 | RR: 14 | SpO2: 100% on RA  Medications Given: morphine 4 mg, lokelma 10 g, calcium gluconate 2 g, albuterol 3 ml x 3, NS 1L bolus, augmentin 875, metronidazole 500

## 2022-10-31 NOTE — H&P ADULT - REASON FOR ADMISSION
0700 Shift change report received from [place], [address] [facility]. SBAR, Ralf, Procedure Summary, Intake/Output, MAR, Accordion, Recent Results and Cardiac Rhythm SB/SR reviewed. 1900  Bedside report given to MARY CARMEN Duvall. SBAR, Ralf, Procedure Summary, Intake/Output, MAR, Accordion, Recent Results and Cardiac Rhythm SB/SR reviewed. Patient is stable at this time. Call light within reach, bed alarm on, bed in low position. abdominal pain

## 2022-10-31 NOTE — H&P ADULT - NSHPREVIEWOFSYSTEMS_GEN_ALL_CORE
REVIEW OF SYSTEMS:    CONSTITUTIONAL: No weakness, fevers or chills  EYES: No visual changes; no sclera icterics, no pain or drainage  ENT:  No vertigo or throat pain   NECK: No pain or stiffness  RESPIRATORY: No cough, wheezing, hemoptysis; No shortness of breath  CARDIOVASCULAR: No chest pain or palpitations  GASTROINTESTINAL: (+) LLQ abdominal or epigastric pain. No nausea, vomiting, or hematemesis; No diarrhea or constipation. No melena or hematochezia.  GENITOURINARY: No dysuria, frequency or hematuria  NEUROLOGICAL: No numbness or weakness  SKIN: No itching, rashes  Psych: No anxiety or depression

## 2022-10-31 NOTE — H&P ADULT - PROBLEM SELECTOR PLAN 1
Presenting with LLQ pain with CTAP findings concerning for acute diverticulitis in descending colon and sigmoid with no evidence of abscess or perforation. Presentation similar to approx 6 weeks ago s/p augmentin x - days.   - IV antibiotics with zosyn for gram negative rods and anaerobic coverage  - IV LR 75 cc   - pain control with   - NPO; can advance diet as tolerate in 1-2 days  - will require GI follow up in the OP setting after resolution of acute episode Presenting with LLQ pain with CTAP findings concerning for acute diverticulitis in descending colon and sigmoid with no evidence of abscess or perforation. Presentation similar to approx 6 weeks ago s/p augmentin and metronidazole.    - IV antibiotics with zosyn for gram negative rods and anaerobic coverage  - IV NS 75 cc   - pain control with tylenol for moderate pain   - pain control with morphine 2 q8 PRN for severe pain  - clear liquids; advance diet as tolerated  - will require GI follow up in the OP setting after resolution of acute episode

## 2022-10-31 NOTE — ED ADULT NURSE REASSESSMENT NOTE - NS ED NURSE REASSESS COMMENT FT1
received report from break rn. pt resting comfortably, offers no complaints, vss, rr even and unlabored. NAD noted, states pain minimal. NSR on monitor. Medication given, awaiting further orders from provider.

## 2022-10-31 NOTE — H&P ADULT - PROBLEM SELECTOR PLAN 7
Presenting with CKD per lab findings presenting with Cr 1.62 (baseline 2 in 2020) likely in the setting of chronic HTN and DM2.   - continue to monitor  - recommend OP f/u with PCP

## 2022-10-31 NOTE — H&P ADULT - PROBLEM SELECTOR PLAN 3
History of HTN managed at home on amlodipine 5  - c/w amlodipine 5  - monitor BP History of HTN managed at home on amlodipine 10 QD and olmesartan 80 QD  - c/w amlodipine 10 QD   - hold olmesartan 80 QD in the setting of hyperkalemia; can resume once hyperkalemia resolves in AM  - monitor BP

## 2022-10-31 NOTE — DISCHARGE NOTE PROVIDER - PROVIDER TOKENS
PROVIDER:[TOKEN:[[id number]]] PROVIDER:[TOKEN:[[id number]],ESTABLISHEDPATIENT:[T]],FREE:[LAST:[Tan],FIRST:[Maritza],PHONE:[phone]],FAX:[(   )    -],ADDRESS:[[address] [place] [address]],ESTABLISHEDPATIENT:[T]]

## 2022-10-31 NOTE — H&P ADULT - ATTENDING COMMENTS
72y Male with PMH significant for HTN, HLD, DM 2, CKD, gout, prostate cancer s/p prostatectomy, R inguinal hernia repair 7/2022 presenting for left lower quadrant abdominal pain 2/2 recurrent diverticulitis. Zosyn, IVF, pain control. Hyperkalemia of unclear etiol, SCr within baseline; lokelma given, hold arb

## 2022-10-31 NOTE — H&P ADULT - ASSESSMENT
72y Male with PMH significant for HTN, HLD, DM 2, gout, prostate cancer s/p prostatectomy, R inguinal hernia repair 7/2022 presenting for left lower quadrant abdominal pain concerning for acute diverticulitis.

## 2022-10-31 NOTE — H&P ADULT - PROBLEM SELECTOR PLAN 6
History of chronic gout in -- managed at home on allopurinol 100 QD, colchicine 0.6 QD, and meloxicam 15 QD.  - c/w home medications History of chronic gout managed at home on allopurinol 100 QD.  - c/w allopurinol 100 QD.

## 2022-10-31 NOTE — DISCHARGE NOTE PROVIDER - HOSPITAL COURSE
72M HTN, HLD, T2DM, CKD, gout, prostate CA (s/p prostatectomy), R inguinal hernia repiar (7/2022) recent ED presentation 8/2022 iso acute diverticulitis dc'd on PO augmentin and planned to f/u GI when pt experienced acute LLQ pain days PTA a/w CTAP c/f acute diverticulitis descending/sigmoid colon initiated Zosyn, w/ improvement in sx, transitioned to ___ for dc home to f/u PCP, GI for further eval/mgt    Case and plan d/w Dr. Rebolledo (attending physician)   72M HTN, HLD, T2DM, CKD, gout, prostate CA (s/p prostatectomy), R inguinal hernia repiar (7/2022) recent ED presentation 8/2022 iso acute diverticulitis dc'd on PO augmentin and planned to f/u GI when pt experienced acute LLQ pain days PTA a/w CTAP c/f acute diverticulitis descending/sigmoid colon initiated Zosyn, w/ improvement in sx, transitioned to oral regimen of Cefpodoxime and Flagyl to complete a total of 14 days of antibiotic therapy for discharge home to f/u PCP, and GI in 6-8 weeks to f/u for evaluation and colonoscopy.     Case and plan d/w Dr. Rebolledo (attending physician)   72M HTN, HLD, T2DM, CKD, gout, prostate CA (s/p prostatectomy), R inguinal hernia repiar (7/2022) recent ED presentation 8/2022 iso acute diverticulitis dc'd on PO augmentin and planned to f/u GI when pt experienced acute LLQ pain days PTA a/w CTAP c/f acute diverticulitis descending/sigmoid colon initiated Zosyn, w/ improvement in sx, transitioned to oral regimen of Cefpodoxime and Flagyl to complete a total of 14 days of antibiotic therapy for discharge home to f/u PCP, and GI in 6-8 weeks to f/u for evaluation and colonoscopy.     Upon presentation, patient was found to be     Case and plan d/w Dr. Rebolledo (attending physician)   72M HTN, HLD, T2DM, CKD, gout, prostate CA (s/p prostatectomy), R inguinal hernia repiar (7/2022) recent ED presentation 8/2022 iso acute diverticulitis dc'd on PO augmentin and planned to f/u GI when pt experienced acute LLQ pain days PTA a/w CTAP c/f acute diverticulitis descending/sigmoid colon initiated Zosyn, w/ improvement in sx, transitioned to oral regimen of Cefpodoxime and Flagyl to complete a total of 14 days of antibiotic therapy for discharge home to f/u PCP, and GI in 6-8 weeks to f/u for evaluation and colonoscopy.     Upon presentation, patient was found to be hyperkalemic to 5.5 of which a repeat was 5.8. Pt administered lokelma 10 g, calcium gluconate 2 g, albuterol 3 ml x 3, NS 1L in the ED which rendered resolution of hyperkalemia, and Potassium was stable at 4.3 for the duration of the admission. Pt's home medication olmesartan was held during admission due to the concern of hyperkalemia but was restarted on discharge as pt did not demonstrate re-occurrence of hyperkalemia. Pt was normotensive throughout hospital course.     Case and plan d/w Dr. Rebolledo (attending physician)

## 2022-10-31 NOTE — ED ADULT NURSE REASSESSMENT NOTE - NS ED NURSE REASSESS COMMENT FT1
Break RN note- Patient resting quietly in bed, breathing even and nonlabored. No acute distress. Patient reports his abdominal pain has improved. Patient denies any chest pain or palpitations. Cardiac monitor in place- sinus rhythm. Patient medicated as ordered. Safety maintained. Patient stable upon exiting the room.

## 2022-10-31 NOTE — DISCHARGE NOTE PROVIDER - CARE PROVIDER_API CALL
Braeden Dejesus  [facility]  [address], [address]  [place] [address]  Phone: [phone]  Fax: [phone]  Follow Up Time:    Braeden Dejesus  [facility]  [address], [address]  [place] [address]  Phone: [phone]  Fax: [phone]  Established Patient  Follow Up Time:     Maritza Benson  [address], [place] [address]  Phone: [phone]  Fax: (   )    -  Established Patient  Follow Up Time:

## 2022-10-31 NOTE — DISCHARGE NOTE PROVIDER - NSDCCAREPROVSEEN_GEN_ALL_CORE_FT
[facility] Medicine HS4  Dr. Rebolledo (attending physician)  Dr. Hamilton (resident physician)  Dr. Diana (resident physician)

## 2022-10-31 NOTE — DISCHARGE NOTE PROVIDER - NSDCFUADDAPPT_GEN_ALL_CORE_FT
APPTS ARE READY TO BE MADE: [X] YES      Additional Information about above appointments (if needed):    1: Please follow-up with your primary care physician within one week of discharge.   2: Please follow-up with the Gastroenterologist within 6-8 weeks of discharge to be evaluated.       Other comments or requests:

## 2022-10-31 NOTE — H&P ADULT - PROBLEM SELECTOR PLAN 5
History of diabetes managed at home on metformin 500 BID.  - HbA1c   - hold metformin in the inpatient setting  - SSI q6 while NPO  - FSG q6 while NPO History of diabetes managed at home on metformin 500 BID.  - HbA1c   - hold metformin in the inpatient setting  - SSI   - FSG

## 2022-10-31 NOTE — DISCHARGE NOTE PROVIDER - NSDCMRMEDTOKEN_GEN_ALL_CORE_FT
allopurinol 100 mg oral tablet: 1 tab(s) orally once a day  amLODIPine 10 mg oral tablet: 1 tab(s) orally once a day  atorvastatin 40 mg oral tablet: 1 tab(s) orally once a day  metFORMIN 500 mg oral tablet: 1 tab(s) orally 2 times a day  multivitamin: 1  orally once a day  olmesartan 40 mg oral tablet: 2 tab(s) orally once a day  Omega Essentials: 1  orally once a day  Vitamin B12: 1  orally once a day  Vitamin D3 50 mcg (2000 intl units) oral tablet: 1 tab(s) orally once a day   allopurinol 100 mg oral tablet: 1 tab(s) orally once a day  amLODIPine 10 mg oral tablet: 1 tab(s) orally once a day  atorvastatin 40 mg oral tablet: 1 tab(s) orally once a day  cefpodoxime 200 mg oral tablet: 1 tab(s) orally 2 times a day   metFORMIN 500 mg oral tablet: 1 tab(s) orally 2 times a day  metroNIDAZOLE 500 mg oral tablet: 1 tab(s) orally 3 times a day   multivitamin: 1  orally once a day  Omega Essentials: 1  orally once a day  Vitamin B12: 1  orally once a day  Vitamin D3 50 mcg (2000 intl units) oral tablet: 1 tab(s) orally once a day   allopurinol 100 mg oral tablet: 1 tab(s) orally once a day  amLODIPine 10 mg oral tablet: 1 tab(s) orally once a day  atorvastatin 40 mg oral tablet: 1 tab(s) orally once a day  Benicar 40 mg oral tablet: 1 tab(s) orally 2 times a day  cefpodoxime 200 mg oral tablet: 1 tab(s) orally 2 times a day   metFORMIN 500 mg oral tablet: 1 tab(s) orally 2 times a day  metroNIDAZOLE 500 mg oral tablet: 1 tab(s) orally 3 times a day   multivitamin: 1  orally once a day  Omega Essentials: 1  orally once a day  Vitamin B12: 1  orally once a day  Vitamin D3 50 mcg (2000 intl units) oral tablet: 1 tab(s) orally once a day

## 2022-10-31 NOTE — PROGRESS NOTE ADULT - PROBLEM SELECTOR PLAN 1
Presenting with LLQ pain with CTAP findings concerning for acute diverticulitis in descending colon and sigmoid with no evidence of abscess or perforation. Presentation similar to approx 6 weeks ago s/p augmentin and metronidazole.    - IV antibiotics with zosyn for gram negative rods and anaerobic coverage  - IV NS 75 cc   - pain control with tylenol for moderate pain   - pain control with morphine 2 q8 PRN for severe pain  - clear liquids; advance diet as tolerated  - will require GI follow up in the OP setting after resolution of acute episode Presenting with LLQ pain with CTAP findings concerning for acute diverticulitis in descending colon and sigmoid with no evidence of abscess or perforation. Presentation similar to approx 6 weeks ago s/p augmentin and metronidazole.    - IV antibiotics with zosyn for gram negative rods and anaerobic coverage  - IV NS 75 cc   - pain control with tylenol for moderate pain   - clear liquids; advance diet as tolerated  - will require GI follow up in the OP setting after resolution of acute episode

## 2022-10-31 NOTE — PROGRESS NOTE ADULT - PROBLEM SELECTOR PLAN 2
Presenting with K 5.5/5.8 on repeat s/p lokelma, calcium gluconate 2g and albuterol 3 mL x 3 in ED for stabilization of K. EKG with no signs of T wave abnormalities. Unclear etiology.  - repeat BMP in AM to check for resolution of hyperkalemia  - telemetry monitoring Presenting with K 5.5/5.8 on repeat s/p lokelma, calcium gluconate 2g and albuterol 3 mL x 3 in ED for stabilization of K. EKG with no signs of T wave abnormalities. Unclear etiology.  - repeat BMP in AM to check for resolution of hyperkalemia  - Potassium levels resolved to date, 10/31/22

## 2022-10-31 NOTE — H&P ADULT - NSHPLABSRESULTS_GEN_ALL_CORE
Labs:  CAPILLARY BLOOD GLUCOSE      POCT Blood Glucose.: 103 mg/dL (30 Oct 2022 19:19)                          15.1   7.53  )-----------( 211      ( 30 Oct 2022 19:50 )             46.9       Auto Neutrophil %: 65.6 % (10-30-22 @ 19:50)  Auto Immature Granulocyte %: 0.3 % (10-30-22 @ 19:50)    10-30    138  |  103  |  26<H>  ----------------------------<  96  5.8<H>   |  26  |  1.62<H>      Calcium, Total Serum: 9.3 mg/dL (10-30-22 @ 21:48)      LFTs:             7.5  | 0.4  | 34       ------------------[73      ( 30 Oct 2022 19:50 )  4.1  | x    | 15          Lipase:x      Amylase:x         Blood Gas Venous - Lactate: 0.8 mmol/L (10-30-22 @ 19:50)      Coags:      Urinalysis Basic - ( 30 Oct 2022 19:47 )    Color: Light Yellow / Appearance: Clear / S.014 / pH: x  Gluc: x / Ketone: Negative  / Bili: Negative / Urobili: <2 mg/dL   Blood: x / Protein: 30 mg/dL / Nitrite: Negative   Leuk Esterase: Negative / RBC: 0 /HPF / WBC 0 /HPF   Sq Epi: x / Non Sq Epi: 0 /HPF / Bacteria: Negative      CT AP w IV Contrast 10/30/2022  IMPRESSION:  Acute diverticulitis involving the junction of the distal descending and   sigmoid colon. No extraluminal gas or drainable fluid collection.   Follow-up colonoscopy after treatment/resolution is recommended to   exclude underlying inflammatory mass. Labs:  CAPILLARY BLOOD GLUCOSE      POCT Blood Glucose.: 103 mg/dL (30 Oct 2022 19:19)                          15.1   7.53  )-----------( 211      ( 30 Oct 2022 19:50 )             46.9       Auto Neutrophil %: 65.6 % (10-30-22 @ 19:50)  Auto Immature Granulocyte %: 0.3 % (10-30-22 @ 19:50)    10-30    138  |  103  |  26<H>  ----------------------------<  96  5.8<H>   |  26  |  1.62<H>      Calcium, Total Serum: 9.3 mg/dL (10-30-22 @ 21:48)      LFTs:             7.5  | 0.4  | 34       ------------------[73      ( 30 Oct 2022 19:50 )  4.1  | x    | 15          Lipase:x      Amylase:x         Blood Gas Venous - Lactate: 0.8 mmol/L (10-30-22 @ 19:50)      Coags:      Urinalysis Basic - ( 30 Oct 2022 19:47 )    Color: Light Yellow / Appearance: Clear / S.014 / pH: x  Gluc: x / Ketone: Negative  / Bili: Negative / Urobili: <2 mg/dL   Blood: x / Protein: 30 mg/dL / Nitrite: Negative   Leuk Esterase: Negative / RBC: 0 /HPF / WBC 0 /HPF   Sq Epi: x / Non Sq Epi: 0 /HPF / Bacteria: Negative    EKG reviewed by me: NSR with RBBB; QTc 419 ms    CT AP w IV Contrast 10/30/2022  IMPRESSION:  Acute diverticulitis involving the junction of the distal descending and   sigmoid colon. No extraluminal gas or drainable fluid collection.   Follow-up colonoscopy after treatment/resolution is recommended to   exclude underlying inflammatory mass.

## 2022-10-31 NOTE — H&P ADULT - NSHPPHYSICALEXAM_GEN_ALL_CORE
T(C): 36.4 (10-30-22 @ 22:37), Max: 36.8 (10-30-22 @ 19:13)  HR: 69 (10-30-22 @ 22:37) (64 - 69)  BP: 143/80 (10-30-22 @ 22:37) (143/80 - 152/78)  RR: 18 (10-30-22 @ 22:37) (14 - 18)  SpO2: 100% (10-30-22 @ 22:37) (100% - 100%)    CONSTITUTIONAL: Well groomed, no apparent distress  EYES: PERRLA and symmetric, EOMI, No conjunctival or scleral injection, non-icteric  ENMT: Oral mucosa with moist membranes. Normal dentition; no pharyngeal injection or exudates  NECK: Supple, symmetric and without tracheal deviation   RESP: No respiratory distress, no use of accessory muscles; CTA b/l, no WRR  CV: RRR, +S1S2, no peripheral edema  GI: Soft, TTP on LLQ, no rebound, no guarding; ND, no palpable masses; no hepatosplenomegaly; no hernia palpated  MSK: No digital clubbing or cyanosis; examination of the (head/neck/spine/ribs/pelvis, RUE, LUE, RLE, LLE) without misalignment. Normal ROM without pain, no spinal tenderness, normal muscle strength/tone  SKIN: No rashes or ulcers noted; no subcutaneous nodules or induration palpable  NEURO: CN II-XII intact; normal reflexes in upper and lower extremities, sensation intact in upper and lower extremities b/l to light touch   PSYCH: Appropriate insight/judgment; A+O x 3, mood and affect appropriate, recent/remote memory intact T(C): 36.4 (10-30-22 @ 22:37), Max: 36.8 (10-30-22 @ 19:13)  HR: 69 (10-30-22 @ 22:37) (64 - 69)  BP: 143/80 (10-30-22 @ 22:37) (143/80 - 152/78)  RR: 18 (10-30-22 @ 22:37) (14 - 18)  SpO2: 100% (10-30-22 @ 22:37) (100% - 100%)    CONSTITUTIONAL: Well groomed, no apparent distress  EYES: PERRLA and symmetric, EOMI, No conjunctival or scleral injection, non-icteric  ENMT: Oral mucosa with moist membranes. Normal dentition; no pharyngeal injection or exudates  NECK: Supple, symmetric and without tracheal deviation   RESP: No respiratory distress, no use of accessory muscles; CTA b/l, no WRR  CV: RRR, +S1S2, no peripheral edema  GI: Soft, TTP on LLQ, no rebound, no guarding; ND, no palpable masses; no hepatosplenomegaly; no hernia palpated. well healing laparoscopic scars in umbilical, LLQ and RUQ.  MSK: No digital clubbing or cyanosis; examination of the (head/neck/spine/ribs/pelvis, RUE, LUE, RLE, LLE) without misalignment. Normal ROM without pain, no spinal tenderness, normal muscle strength/tone  SKIN: No rashes or ulcers noted; no subcutaneous nodules or induration palpable  NEURO: CN II-XII intact; normal reflexes in upper and lower extremities, sensation intact in upper and lower extremities b/l to light touch   PSYCH: Appropriate insight/judgment; A+O x 3, mood and affect appropriate, recent/remote memory intact

## 2022-10-31 NOTE — H&P ADULT - PROBLEM SELECTOR PLAN 8
- Fluids: LR 75 cc  - Electrolytes: Will replete to maintain K>4, Phos>3, and Mag>2  - Nutrition: NPO  - Activity: OOB as tolerated  - DVT Prophylaxis:   - Stress Ulcer/GI Prophylaxis:  - Disposition: admit to medicine - Fluids: LR 75 cc  - Electrolytes: Will replete to maintain K>4, Phos>3, and Mag>2  - Nutrition: clear liquids  - Activity: OOB as tolerated  - DVT Prophylaxis: lovenox  - Stress Ulcer/GI Prophylaxis: NA  - Disposition: admit to medicine - Fluids: ns 75 cc  - Electrolytes: Will replete to maintain K>4, Phos>3, and Mag>2  - Nutrition: clear liquids  - Activity: OOB as tolerated  - DVT Prophylaxis: lovenox  - Stress Ulcer/GI Prophylaxis: NA  - Disposition: admit to medicine

## 2022-10-31 NOTE — DISCHARGE NOTE PROVIDER - NSDCCPCAREPLAN_GEN_ALL_CORE_FT
PRINCIPAL DISCHARGE DIAGNOSIS  Diagnosis: Acute diverticulitis  Assessment and Plan of Treatment: You were admitted to the hospital with abdominal pain, with imaging concerning for a repeat episode of acute diverticultis, for which you were initiated on intravenous antibiotics. Your symptoms improved, therefore you will be discharged on oral antibiotics to finish a course as prescribed.   Please follow up with your primary care physician, as well as a gastroenterologist for further evaluation and management upon discharge.  Please return to the hospital if you experience fever, chills, severe headache, dizziness, lightheadedness, loss of consciousness, visual disturbance, chest pain, palpitations, shortness of breath,  abdominal pain, nausea, vomiting, diarrhea, blood in your vomit/stool/urine, burning with urination or change in urinary pattern, numbness, weakness, tingling, or other alarming symptoms.         PRINCIPAL DISCHARGE DIAGNOSIS  Diagnosis: Acute diverticulitis  Assessment and Plan of Treatment: You were admitted to the hospital with abdominal pain, with imaging concerning for a repeat episode of acute diverticultis, for which you were initiated on intravenous antibiotics. Your symptoms improved, therefore you will be discharged on oral antibiotics to finish a course as prescribed.   Please follow up with your primary care physician, as well as a gastroenterologist for further evaluation and management upon discharge.  Please continue to take your prescribed medication, Cefpodoxime 200 mg two times a day, and Flagyl 500 mg three times a day as prescribed. Please follow-up with the gastroenterologist in 6-8 weeks for follow-up and evaluation.   Please return to the hospital if you experience fever, chills, severe headache, dizziness, lightheadedness, loss of consciousness, visual disturbance, chest pain, palpitations, shortness of breath,  abdominal pain, nausea, vomiting, diarrhea, blood in your vomit/stool/urine, burning with urination or change in urinary pattern, numbness, weakness, tingling, or other alarming symptoms.         PRINCIPAL DISCHARGE DIAGNOSIS  Diagnosis: Acute diverticulitis  Assessment and Plan of Treatment: You were admitted to the hospital with abdominal pain, with imaging concerning for a repeat episode of acute diverticultis, for which you were initiated on intravenous antibiotics. Your symptoms improved, therefore you will be discharged on oral antibiotics to finish a course as prescribed.   Please follow up with your primary care physician, as well as a gastroenterologist for further evaluation and management upon discharge.  Please continue to take your prescribed medication, Cefpodoxime 200 mg two times a day, and Flagyl 500 mg three times a day as prescribed. Please follow-up with the gastroenterologist in 6-8 weeks for follow-up and evaluation.   Please return to the hospital if you experience fever, chills, severe headache, dizziness, lightheadedness, loss of consciousness, visual disturbance, chest pain, palpitations, shortness of breath,  abdominal pain, nausea, vomiting, diarrhea, blood in your vomit/stool/urine, burning with urination or change in urinary pattern, numbness, weakness, tingling, or other alarming symptoms.        SECONDARY DISCHARGE DIAGNOSES  Diagnosis: Acute hyperkalemia  Assessment and Plan of Treatment: You had high potassium levels when you came to the hospital.   is a medical problem in which you have too much potassium in your blood. Your body needs potassium. It is an important nutrient that is found in many of the foods you eat. Potassium helps your nerves and muscles, including your heart, work the right way. Potassium levels could be dangerous and cause problems with electrical conduction in your heart. When you came to the emergency department, you were given medications to lower your potassium level. One medication that you take "olmesartan" was held while you were in the hospital because it can cause high potassium levels. While you were in the hospital your potassium levels remained at normal levels after you received treatment, and you may restart the olmesartan medication upon discharge. Please follow-up within one week after discharge with your primary care physician to obtain bloodwork, have your potassium levels checked, and be evaluated.

## 2022-10-31 NOTE — PROGRESS NOTE ADULT - SUBJECTIVE AND OBJECTIVE BOX
**************************************  George Diana, PGY-1  Senior Resident: Haroon Loza  After 7PM, please contact night float at #[phone] or [phone]  **************************************    INTERVAL HPI/OVERNIGHT EVENTS:  Patient was seen and examined at bedside. As per nurse and patient, no o/n events, patient resting comfortably. No complaints at this time. Patient denies: fever, chills, dizziness, weakness, HA, Changes in vision, CP, palpitations, SOB, cough, N/V/D/C, dysuria, changes in bowel movements, LE edema. ROS otherwise negative.    VITAL SIGNS:  T(F): 98.2 (10-31-22 @ 09:52)  HR: 60 (10-31-22 @ 09:52)  BP: 124/68 (10-31-22 @ 09:52)  RR: 18 (10-31-22 @ 09:52)  SpO2: 100% (10-31-22 @ 09:52)  Wt(kg): --    PHYSICAL EXAM:    Constitutional: WDWN, NAD  HEENT: PERRL, EOMI, sclera non-icteric, neck supple, trachea midline, no masses, no JVD, MMM, good dentition  Respiratory: CTA b/l, good air entry b/l, no wheezing, no rhonchi, no rales, without accessory muscle use and no intercostal retractions  Cardiovascular: RRR, normal S1S2, no M/R/G  Gastrointestinal: soft, NTND, no masses palpable, BS normal  Extremities: Warm, well perfused, pulses equal bilateral upper and lower extremities, no edema, no clubbing. Capillary refill <2 sec  Neurological: AAOx3, CN Grossly intact  Skin: Normal temperature, warm, dry    MEDICATIONS  (STANDING):  allopurinol 100 milliGRAM(s) Oral daily  amLODIPine   Tablet 10 milliGRAM(s) Oral daily  atorvastatin 40 milliGRAM(s) Oral at bedtime  cyanocobalamin 1000 MICROGram(s) Oral daily  dextrose 5%. 1000 milliLiter(s) (50 mL/Hr) IV Continuous <Continuous>  dextrose 5%. 1000 milliLiter(s) (100 mL/Hr) IV Continuous <Continuous>  dextrose 50% Injectable 25 Gram(s) IV Push once  dextrose 50% Injectable 12.5 Gram(s) IV Push once  dextrose 50% Injectable 25 Gram(s) IV Push once  enoxaparin Injectable 40 milliGRAM(s) SubCutaneous every 24 hours  glucagon  Injectable 1 milliGRAM(s) IntraMuscular once  insulin lispro (ADMELOG) corrective regimen sliding scale   SubCutaneous three times a day before meals  insulin lispro (ADMELOG) corrective regimen sliding scale   SubCutaneous at bedtime  multivitamin 1 Tablet(s) Oral daily  omega-3-Acid Ethyl Esters 4 Gram(s) Oral daily  piperacillin/tazobactam IVPB.. 3.375 Gram(s) IV Intermittent every 8 hours  sodium chloride 0.9%. 1000 milliLiter(s) (75 mL/Hr) IV Continuous <Continuous>    MEDICATIONS  (PRN):  acetaminophen     Tablet .. 650 milliGRAM(s) Oral every 6 hours PRN Temp greater or equal to 38C (100.4F), Mild Pain (1 - 3), Moderate Pain (4 - 6)  aluminum hydroxide/magnesium hydroxide/simethicone Suspension 30 milliLiter(s) Oral every 4 hours PRN Dyspepsia  dextrose Oral Gel 15 Gram(s) Oral once PRN Blood Glucose LESS THAN 70 milliGRAM(s)/deciliter  melatonin 3 milliGRAM(s) Oral at bedtime PRN Insomnia      Allergies    Cipro (Other)  cipro/quinalin (Unknown)    Intolerances        LABS:                        13.4   7.87  )-----------( 180      ( 31 Oct 2022 05:33 )             40.9     10-31    134<L>  |  98  |  22  ----------------------------<  115<H>  4.3   |  25  |  1.51<H>    Ca    9.6      31 Oct 2022 05:33  Phos  4.4     10-31  Mg     1.80     10-31    TPro  6.8  /  Alb  3.8  /  TBili  0.6  /  DBili  x   /  AST  21  /  ALT  15  /  AlkPhos  71  10-31      Urinalysis Basic - ( 30 Oct 2022 19:47 )    Color: Light Yellow / Appearance: Clear / S.014 / pH: x  Gluc: x / Ketone: Negative  / Bili: Negative / Urobili: <2 mg/dL   Blood: x / Protein: 30 mg/dL / Nitrite: Negative   Leuk Esterase: Negative / RBC: 0 /HPF / WBC 0 /HPF   Sq Epi: x / Non Sq Epi: 0 /HPF / Bacteria: Negative        RADIOLOGY & ADDITIONAL TESTS:  Reviewed **************************************  George Diana, PGY-1    After 7PM, please contact night float at #[phone] or [phone]  **************************************    INTERVAL HPI/OVERNIGHT EVENTS:  Patient was seen and examined at bedside. LLQ pain is now a 5/10 per patient as opposed to a 8/10 on admission. Pt reports having appetite, denies n/v/d/c. Pt states that he is passing gas this AM>     VITAL SIGNS:  T(F): 98.2 (10-31-22 @ 09:52)  HR: 60 (10-31-22 @ 09:52)  BP: 124/68 (10-31-22 @ 09:52)  RR: 18 (10-31-22 @ 09:52)  SpO2: 100% (10-31-22 @ 09:52)  Wt(kg): --    PHYSICAL EXAM:    Constitutional: WDWN, NAD  HEENT: PERRL, EOMI, sclera non-icteric, neck supple, trachea midline, no masses, no JVD, MMM, good dentition  Respiratory: CTA b/l, good air entry b/l, no wheezing, no rhonchi, no rales, without accessory muscle use and no intercostal retractions  Cardiovascular: RRR, normal S1S2, no M/R/G  Gastrointestinal: soft, NTND, no masses palpable, BS normal  Extremities: Warm, well perfused, pulses equal bilateral upper and lower extremities, no edema, no clubbing. Capillary refill <2 sec  Neurological: AAOx3, CN Grossly intact  Skin: Normal temperature, warm, dry    MEDICATIONS  (STANDING):  allopurinol 100 milliGRAM(s) Oral daily  amLODIPine   Tablet 10 milliGRAM(s) Oral daily  atorvastatin 40 milliGRAM(s) Oral at bedtime  cyanocobalamin 1000 MICROGram(s) Oral daily  dextrose 5%. 1000 milliLiter(s) (50 mL/Hr) IV Continuous <Continuous>  dextrose 5%. 1000 milliLiter(s) (100 mL/Hr) IV Continuous <Continuous>  dextrose 50% Injectable 25 Gram(s) IV Push once  dextrose 50% Injectable 12.5 Gram(s) IV Push once  dextrose 50% Injectable 25 Gram(s) IV Push once  enoxaparin Injectable 40 milliGRAM(s) SubCutaneous every 24 hours  glucagon  Injectable 1 milliGRAM(s) IntraMuscular once  insulin lispro (ADMELOG) corrective regimen sliding scale   SubCutaneous three times a day before meals  insulin lispro (ADMELOG) corrective regimen sliding scale   SubCutaneous at bedtime  multivitamin 1 Tablet(s) Oral daily  omega-3-Acid Ethyl Esters 4 Gram(s) Oral daily  piperacillin/tazobactam IVPB.. 3.375 Gram(s) IV Intermittent every 8 hours  sodium chloride 0.9%. 1000 milliLiter(s) (75 mL/Hr) IV Continuous <Continuous>    MEDICATIONS  (PRN):  acetaminophen     Tablet .. 650 milliGRAM(s) Oral every 6 hours PRN Temp greater or equal to 38C (100.4F), Mild Pain (1 - 3), Moderate Pain (4 - 6)  aluminum hydroxide/magnesium hydroxide/simethicone Suspension 30 milliLiter(s) Oral every 4 hours PRN Dyspepsia  dextrose Oral Gel 15 Gram(s) Oral once PRN Blood Glucose LESS THAN 70 milliGRAM(s)/deciliter  melatonin 3 milliGRAM(s) Oral at bedtime PRN Insomnia      Allergies    Cipro (Other)  cipro/quinalin (Unknown)    Intolerances        LABS:                        13.4   7.87  )-----------( 180      ( 31 Oct 2022 05:33 )             40.9     10-31    134<L>  |  98  |  22  ----------------------------<  115<H>  4.3   |  25  |  1.51<H>    Ca    9.6      31 Oct 2022 05:33  Phos  4.4     10-31  Mg     1.80     10-31    TPro  6.8  /  Alb  3.8  /  TBili  0.6  /  DBili  x   /  AST  21  /  ALT  15  /  AlkPhos  71  10-31      Urinalysis Basic - ( 30 Oct 2022 19:47 )    Color: Light Yellow / Appearance: Clear / S.014 / pH: x  Gluc: x / Ketone: Negative  / Bili: Negative / Urobili: <2 mg/dL   Blood: x / Protein: 30 mg/dL / Nitrite: Negative   Leuk Esterase: Negative / RBC: 0 /HPF / WBC 0 /HPF   Sq Epi: x / Non Sq Epi: 0 /HPF / Bacteria: Negative        RADIOLOGY & ADDITIONAL TESTS:  Reviewed **************************************  George Diana, PGY-1    After 7PM, please contact night float at #[phone] or [phone]  **************************************    INTERVAL HPI/OVERNIGHT EVENTS:  Patient was seen and examined at bedside. LLQ pain is now a 5/10 per patient as opposed to a 8/10 on admission. Pt reports having appetite, denies n/v/d/c. Pt states that he is passing gas this AM>     VITAL SIGNS:  T(F): 98.2 (10-31-22 @ 09:52)  HR: 60 (10-31-22 @ 09:52)  BP: 124/68 (10-31-22 @ 09:52)  RR: 18 (10-31-22 @ 09:52)  SpO2: 100% (10-31-22 @ 09:52)  Wt(kg): --    PHYSICAL EXAM:    Constitutional: WDWN, NAD  HEENT: PERRL, EOMI, sclera non-icteric, neck supple, trachea midline, no masses, no JVD, MMM, good dentition  Respiratory: CTA b/l, good air entry b/l, no wheezing, no rhonchi, no rales, without accessory muscle use and no intercostal retractions  Cardiovascular: RRR, normal S1S2, no M/R/G  Gastrointestinal: soft, nondistended no masses palpable, BS normal, TTP in the LLQ  Extremities: Warm, well perfused, pulses equal bilateral upper and lower extremities, no edema, no clubbing. Capillary refill <2 sec  Neurological: AAOx3, CN Grossly intact  Skin: Normal temperature, warm, dry    MEDICATIONS  (STANDING):  allopurinol 100 milliGRAM(s) Oral daily  amLODIPine   Tablet 10 milliGRAM(s) Oral daily  atorvastatin 40 milliGRAM(s) Oral at bedtime  cyanocobalamin 1000 MICROGram(s) Oral daily  dextrose 5%. 1000 milliLiter(s) (50 mL/Hr) IV Continuous <Continuous>  dextrose 5%. 1000 milliLiter(s) (100 mL/Hr) IV Continuous <Continuous>  dextrose 50% Injectable 25 Gram(s) IV Push once  dextrose 50% Injectable 12.5 Gram(s) IV Push once  dextrose 50% Injectable 25 Gram(s) IV Push once  enoxaparin Injectable 40 milliGRAM(s) SubCutaneous every 24 hours  glucagon  Injectable 1 milliGRAM(s) IntraMuscular once  insulin lispro (ADMELOG) corrective regimen sliding scale   SubCutaneous three times a day before meals  insulin lispro (ADMELOG) corrective regimen sliding scale   SubCutaneous at bedtime  multivitamin 1 Tablet(s) Oral daily  omega-3-Acid Ethyl Esters 4 Gram(s) Oral daily  piperacillin/tazobactam IVPB.. 3.375 Gram(s) IV Intermittent every 8 hours  sodium chloride 0.9%. 1000 milliLiter(s) (75 mL/Hr) IV Continuous <Continuous>    MEDICATIONS  (PRN):  acetaminophen     Tablet .. 650 milliGRAM(s) Oral every 6 hours PRN Temp greater or equal to 38C (100.4F), Mild Pain (1 - 3), Moderate Pain (4 - 6)  aluminum hydroxide/magnesium hydroxide/simethicone Suspension 30 milliLiter(s) Oral every 4 hours PRN Dyspepsia  dextrose Oral Gel 15 Gram(s) Oral once PRN Blood Glucose LESS THAN 70 milliGRAM(s)/deciliter  melatonin 3 milliGRAM(s) Oral at bedtime PRN Insomnia      Allergies    Cipro (Other)  cipro/quinalin (Unknown)    Intolerances        LABS:                        13.4   7.87  )-----------( 180      ( 31 Oct 2022 05:33 )             40.9     10-31    134<L>  |  98  |  22  ----------------------------<  115<H>  4.3   |  25  |  1.51<H>    Ca    9.6      31 Oct 2022 05:33  Phos  4.4     10-31  Mg     1.80     10-31    TPro  6.8  /  Alb  3.8  /  TBili  0.6  /  DBili  x   /  AST  21  /  ALT  15  /  AlkPhos  71  10-31      Urinalysis Basic - ( 30 Oct 2022 19:47 )    Color: Light Yellow / Appearance: Clear / S.014 / pH: x  Gluc: x / Ketone: Negative  / Bili: Negative / Urobili: <2 mg/dL   Blood: x / Protein: 30 mg/dL / Nitrite: Negative   Leuk Esterase: Negative / RBC: 0 /HPF / WBC 0 /HPF   Sq Epi: x / Non Sq Epi: 0 /HPF / Bacteria: Negative        RADIOLOGY & ADDITIONAL TESTS:  Reviewed

## 2022-10-31 NOTE — H&P ADULT - PROBLEM SELECTOR PLAN 2
Presenting with K 5.5/5.8 on repeat s/p calcium gluconate 2g and albuterol 3 mL x 3 in ED for stabilization of K. EKG with no signs of T wave abnormalities.  - repeat BMP Presenting with K 5.5/5.8 on repeat s/p lokelma, calcium gluconate 2g and albuterol 3 mL x 3 in ED for stabilization of K. EKG with no signs of T wave abnormalities. Unclear etiology.  - repeat BMP in AM to check for resolution of hyperkalemia  - telemetry monitoring

## 2022-11-01 ENCOUNTER — TRANSCRIPTION ENCOUNTER (OUTPATIENT)
Age: 72
End: 2022-11-01

## 2022-11-01 VITALS
SYSTOLIC BLOOD PRESSURE: 137 MMHG | OXYGEN SATURATION: 100 % | DIASTOLIC BLOOD PRESSURE: 79 MMHG | TEMPERATURE: 98 F | HEART RATE: 71 BPM | RESPIRATION RATE: 17 BRPM

## 2022-11-01 LAB
ANION GAP SERPL CALC-SCNC: 13 MMOL/L — SIGNIFICANT CHANGE UP (ref 7–14)
BUN SERPL-MCNC: 23 MG/DL — SIGNIFICANT CHANGE UP (ref 7–23)
CALCIUM SERPL-MCNC: 9.6 MG/DL — SIGNIFICANT CHANGE UP (ref 8.4–10.5)
CHLORIDE SERPL-SCNC: 102 MMOL/L — SIGNIFICANT CHANGE UP (ref 98–107)
CO2 SERPL-SCNC: 24 MMOL/L — SIGNIFICANT CHANGE UP (ref 22–31)
CREAT SERPL-MCNC: 1.64 MG/DL — HIGH (ref 0.5–1.3)
EGFR: 44 ML/MIN/1.73M2 — LOW
GLUCOSE BLDC GLUCOMTR-MCNC: 101 MG/DL — HIGH (ref 70–99)
GLUCOSE BLDC GLUCOMTR-MCNC: 114 MG/DL — HIGH (ref 70–99)
GLUCOSE SERPL-MCNC: 96 MG/DL — SIGNIFICANT CHANGE UP (ref 70–99)
HCT VFR BLD CALC: 41.7 % — SIGNIFICANT CHANGE UP (ref 39–50)
HCV AB S/CO SERPL IA: 0.1 S/CO — SIGNIFICANT CHANGE UP (ref 0–0.99)
HCV AB SERPL-IMP: SIGNIFICANT CHANGE UP
HGB BLD-MCNC: 13.7 G/DL — SIGNIFICANT CHANGE UP (ref 13–17)
MAGNESIUM SERPL-MCNC: 2.1 MG/DL — SIGNIFICANT CHANGE UP (ref 1.6–2.6)
MCHC RBC-ENTMCNC: 28.5 PG — SIGNIFICANT CHANGE UP (ref 27–34)
MCHC RBC-ENTMCNC: 32.9 GM/DL — SIGNIFICANT CHANGE UP (ref 32–36)
MCV RBC AUTO: 86.7 FL — SIGNIFICANT CHANGE UP (ref 80–100)
NRBC # BLD: 0 /100 WBCS — SIGNIFICANT CHANGE UP (ref 0–0)
NRBC # FLD: 0 K/UL — SIGNIFICANT CHANGE UP (ref 0–0)
PHOSPHATE SERPL-MCNC: 3.5 MG/DL — SIGNIFICANT CHANGE UP (ref 2.5–4.5)
PLATELET # BLD AUTO: 174 K/UL — SIGNIFICANT CHANGE UP (ref 150–400)
POTASSIUM SERPL-MCNC: 4.3 MMOL/L — SIGNIFICANT CHANGE UP (ref 3.5–5.3)
POTASSIUM SERPL-SCNC: 4.3 MMOL/L — SIGNIFICANT CHANGE UP (ref 3.5–5.3)
RBC # BLD: 4.81 M/UL — SIGNIFICANT CHANGE UP (ref 4.2–5.8)
RBC # FLD: 14.3 % — SIGNIFICANT CHANGE UP (ref 10.3–14.5)
SODIUM SERPL-SCNC: 139 MMOL/L — SIGNIFICANT CHANGE UP (ref 135–145)
WBC # BLD: 5.58 K/UL — SIGNIFICANT CHANGE UP (ref 3.8–10.5)
WBC # FLD AUTO: 5.58 K/UL — SIGNIFICANT CHANGE UP (ref 3.8–10.5)

## 2022-11-01 PROCEDURE — 99239 HOSP IP/OBS DSCHRG MGMT >30: CPT

## 2022-11-01 RX ORDER — SENNA PLUS 8.6 MG/1
2 TABLET ORAL ONCE
Refills: 0 | Status: COMPLETED | OUTPATIENT
Start: 2022-11-01 | End: 2022-11-01

## 2022-11-01 RX ORDER — OLMESARTAN MEDOXOMIL 5 MG/1
2 TABLET, FILM COATED ORAL
Qty: 0 | Refills: 0 | DISCHARGE

## 2022-11-01 RX ORDER — OLMESARTAN MEDOXOMIL 5 MG/1
1 TABLET, FILM COATED ORAL
Qty: 0 | Refills: 0 | DISCHARGE
Start: 2022-11-01

## 2022-11-01 RX ORDER — INFLUENZA VIRUS VACCINE 15; 15; 15; 15 UG/.5ML; UG/.5ML; UG/.5ML; UG/.5ML
0.7 SUSPENSION INTRAMUSCULAR ONCE
Refills: 0 | Status: DISCONTINUED | OUTPATIENT
Start: 2022-11-01 | End: 2022-11-01

## 2022-11-01 RX ADMIN — Medication 1 TABLET(S): at 13:18

## 2022-11-01 RX ADMIN — Medication 100 MILLIGRAM(S): at 13:18

## 2022-11-01 RX ADMIN — SENNA PLUS 2 TABLET(S): 8.6 TABLET ORAL at 13:17

## 2022-11-01 RX ADMIN — PIPERACILLIN AND TAZOBACTAM 25 GRAM(S): 4; .5 INJECTION, POWDER, LYOPHILIZED, FOR SOLUTION INTRAVENOUS at 06:50

## 2022-11-01 RX ADMIN — PREGABALIN 1000 MICROGRAM(S): 225 CAPSULE ORAL at 13:18

## 2022-11-01 RX ADMIN — PIPERACILLIN AND TAZOBACTAM 25 GRAM(S): 4; .5 INJECTION, POWDER, LYOPHILIZED, FOR SOLUTION INTRAVENOUS at 13:15

## 2022-11-01 RX ADMIN — ENOXAPARIN SODIUM 40 MILLIGRAM(S): 100 INJECTION SUBCUTANEOUS at 13:20

## 2022-11-01 RX ADMIN — AMLODIPINE BESYLATE 10 MILLIGRAM(S): 2.5 TABLET ORAL at 06:51

## 2022-11-01 RX ADMIN — Medication 4 GRAM(S): at 13:19

## 2022-11-01 NOTE — DISCHARGE NOTE NURSING/CASE MANAGEMENT/SOCIAL WORK - NSDCPEFALRISK_GEN_ALL_CORE
For information on Fall & Injury Prevention, visit: [email or web address] OR  [email or web address] OR  https://www.cdc.gov/steadi/patient.html

## 2022-11-01 NOTE — PROGRESS NOTE ADULT - ASSESSMENT
72y Male with PMH significant for HTN, HLD, DM 2, gout, prostate cancer s/p prostatectomy, R inguinal hernia repair 7/2022 presenting for left lower quadrant abdominal pain concerning for acute diverticulitis. 
72y Male with PMH significant for HTN, HLD, DM 2, gout, prostate cancer s/p prostatectomy, R inguinal hernia repair 7/2022 presenting for left lower quadrant abdominal pain concerning for acute diverticulitis. Pt is improving on zosyn

## 2022-11-01 NOTE — PROGRESS NOTE ADULT - PROBLEM SELECTOR PLAN 4
History of hyperlipidemia managed at home on atorvastatin 40.  - c/w atorvastatin 40 QD  - DASH
History of hyperlipidemia managed at home on atorvastatin 40.  - c/w atorvastatin 40 QD  - DASH

## 2022-11-01 NOTE — PROGRESS NOTE ADULT - PROBLEM SELECTOR PLAN 8
- Fluids: ns 75 cc  - Electrolytes: Will replete to maintain K>4, Phos>3, and Mag>2  - Nutrition: clear liquids  - Activity: OOB as tolerated  - DVT Prophylaxis: lovenox  - Stress Ulcer/GI Prophylaxis: NA  - Disposition: admit to medicine
- Fluids: ns 75 cc  - Electrolytes: Will replete to maintain K>4, Phos>3, and Mag>2  - Nutrition: clear liquids  - Activity: OOB as tolerated  - DVT Prophylaxis: lovenox  - Stress Ulcer/GI Prophylaxis: NA  - Disposition: admit to medicine

## 2022-11-01 NOTE — PROGRESS NOTE ADULT - PROBLEM SELECTOR PLAN 7
Presenting with CKD per lab findings presenting with Cr 1.62 (baseline 2 in 2020) likely in the setting of chronic HTN and DM2.   - continue to monitor  - recommend OP f/u with PCP
Presenting with CKD per lab findings presenting with Cr 1.62 (baseline 2 in 2020) likely in the setting of chronic HTN and DM2.   - continue to monitor  - recommend OP f/u with PCP

## 2022-11-01 NOTE — PROGRESS NOTE ADULT - PROBLEM SELECTOR PLAN 1
Presenting with LLQ pain with CTAP findings concerning for acute diverticulitis in descending colon and sigmoid with no evidence of abscess or perforation. Presentation similar to approx 6 weeks ago s/p augmentin and metronidazole.    - IV antibiotics with zosyn for gram negative rods and anaerobic coverage  - IV NS 75 cc   - pain control with tylenol for moderate pain   - clear liquids; advance diet as tolerated  - will require GI follow up in the OP setting after resolution of acute episode

## 2022-11-01 NOTE — PATIENT PROFILE ADULT - FALL HARM RISK - HARM RISK INTERVENTIONS

## 2022-11-01 NOTE — PROGRESS NOTE ADULT - SUBJECTIVE AND OBJECTIVE BOX
**************************************  George Diana, PGY-1  After 7PM, please contact night float at #[phone] or [phone]  **************************************    INTERVAL HPI/OVERNIGHT EVENTS:  Patient was seen and examined at bedside. As per nurse and patient, no o/n events, patient resting comfortably. No complaints at this time. Patient denies: fever, chills, dizziness, weakness, HA, Changes in vision, CP, palpitations, SOB, cough, N/V/D/C, dysuria, ROS otherwise negative.       VITAL SIGNS:  T(F): 98.1 (22 @ 00:40)  HR: 63 (22 @ 00:40)  BP: 140/77 (22 @ 00:40)  RR: 19 (22 @ 00:40)  SpO2: 100% (22 @ 00:40)  Wt(kg): --    PHYSICAL EXAM:    Constitutional: WDWN, NAD  HEENT: PERRL, EOMI, sclera non-icteric, neck supple, trachea midline, no masses, no JVD, MMM, good dentition  Respiratory: CTA b/l, good air entry b/l, no wheezing, no rhonchi, no rales, without accessory muscle use and no intercostal retractions  Cardiovascular: RRR, normal S1S2, no M/R/G  Gastrointestinal: soft, nondistended no masses palpable, BS normal, TTP in the LLQ  Extremities: Warm, well perfused, pulses equal bilateral upper and lower extremities, no edema, no clubbing. Capillary refill <2 sec  Neurological: AAOx3, CN Grossly intact  Skin: Normal temperature, warm,       MEDICATIONS  (STANDING):  allopurinol 100 milliGRAM(s) Oral daily  amLODIPine   Tablet 10 milliGRAM(s) Oral daily  atorvastatin 40 milliGRAM(s) Oral at bedtime  cyanocobalamin 1000 MICROGram(s) Oral daily  dextrose 5%. 1000 milliLiter(s) (50 mL/Hr) IV Continuous <Continuous>  dextrose 5%. 1000 milliLiter(s) (100 mL/Hr) IV Continuous <Continuous>  dextrose 50% Injectable 25 Gram(s) IV Push once  dextrose 50% Injectable 12.5 Gram(s) IV Push once  dextrose 50% Injectable 25 Gram(s) IV Push once  enoxaparin Injectable 40 milliGRAM(s) SubCutaneous every 24 hours  glucagon  Injectable 1 milliGRAM(s) IntraMuscular once  influenza  Vaccine (HIGH DOSE) 0.7 milliLiter(s) IntraMuscular once  insulin lispro (ADMELOG) corrective regimen sliding scale   SubCutaneous three times a day before meals  insulin lispro (ADMELOG) corrective regimen sliding scale   SubCutaneous at bedtime  multivitamin 1 Tablet(s) Oral daily  omega-3-Acid Ethyl Esters 4 Gram(s) Oral daily  piperacillin/tazobactam IVPB.. 3.375 Gram(s) IV Intermittent every 8 hours  sodium chloride 0.9%. 1000 milliLiter(s) (75 mL/Hr) IV Continuous <Continuous>    MEDICATIONS  (PRN):  acetaminophen     Tablet .. 650 milliGRAM(s) Oral every 6 hours PRN Temp greater or equal to 38C (100.4F), Mild Pain (1 - 3), Moderate Pain (4 - 6)  aluminum hydroxide/magnesium hydroxide/simethicone Suspension 30 milliLiter(s) Oral every 4 hours PRN Dyspepsia  dextrose Oral Gel 15 Gram(s) Oral once PRN Blood Glucose LESS THAN 70 milliGRAM(s)/deciliter  melatonin 3 milliGRAM(s) Oral at bedtime PRN Insomnia      Allergies    Cipro (Other)  cipro/quinalin (Unknown)    Intolerances        LABS:                        13.4   7.87  )-----------( 180      ( 31 Oct 2022 05:33 )             40.9     10-31    134<L>  |  98  |  22  ----------------------------<  115<H>  4.3   |  25  |  1.51<H>    Ca    9.6      31 Oct 2022 05:33  Phos  4.4     10  Mg     1.80     10-31    TPro  6.8  /  Alb  3.8  /  TBili  0.6  /  DBili  x   /  AST  21  /  ALT  15  /  AlkPhos  71  10-31      Urinalysis Basic - ( 30 Oct 2022 19:47 )    Color: Light Yellow / Appearance: Clear / S.014 / pH: x  Gluc: x / Ketone: Negative  / Bili: Negative / Urobili: <2 mg/dL   Blood: x / Protein: 30 mg/dL / Nitrite: Negative   Leuk Esterase: Negative / RBC: 0 /HPF / WBC 0 /HPF   Sq Epi: x / Non Sq Epi: 0 /HPF / Bacteria: Negative        RADIOLOGY & ADDITIONAL TESTS:  Reviewed

## 2022-11-01 NOTE — PATIENT PROFILE ADULT - DO YOU FEEL THREATENED BY OTHERS?
Anesthesia Post Evaluation    Patient: Linda Linder    Procedure(s) Performed: Procedure(s) (LRB):  COLONOSCOPY (N/A)  EGD, WITH CLOSED BIOPSY (N/A)    Final Anesthesia Type: MAC      Patient location during evaluation: OPS  Patient participation: Yes- Able to Participate  Level of consciousness: awake  Post-procedure vital signs: reviewed and stable  Pain management: adequate  Airway patency: patent    PONV status at discharge: No PONV  Anesthetic complications: no      Cardiovascular status: blood pressure returned to baseline  Respiratory status: spontaneous ventilation  Hydration status: euvolemic  Follow-up not needed.          Vitals Value Taken Time   /84 12/28/20 0838   Temp 36.5 °C (97.7 °F) 12/28/20 0838   Pulse 65 12/28/20 0838   Resp 18 12/28/20 0838   SpO2 93 % 12/28/20 0838         No case tracking events are documented in the log.      Pain/Lupe Score: Lupe Score: 10 (12/28/2020  8:35 AM)        
no

## 2022-11-01 NOTE — PROGRESS NOTE ADULT - PROBLEM SELECTOR PLAN 3
History of HTN managed at home on amlodipine 10 QD and olmesartan 80 QD  - c/w amlodipine 10 QD   - hold olmesartan 80 QD in the setting of hyperkalemia; can resume once hyperkalemia resolves in AM  - monitor BP
History of HTN managed at home on amlodipine 10 QD and olmesartan 80 QD  - c/w amlodipine 10 QD   - hold olmesartan 80 QD in the setting of hyperkalemia; can resume once hyperkalemia resolves in AM  - monitor BP

## 2022-11-01 NOTE — PROGRESS NOTE ADULT - PROBLEM SELECTOR PLAN 6
History of chronic gout managed at home on allopurinol 100 QD.  - c/w allopurinol 100 QD.
History of chronic gout managed at home on allopurinol 100 QD.  - c/w allopurinol 100 QD.

## 2022-11-01 NOTE — DISCHARGE NOTE NURSING/CASE MANAGEMENT/SOCIAL WORK - PATIENT PORTAL LINK FT
You can access the FollowMyHealth Patient Portal offered by [facility] by registering at the following website: [email or web address]. By joining [employer]’s FollowMyHealth portal, you will also be able to view your health information using other applications (apps) compatible with our system.

## 2022-11-01 NOTE — PROGRESS NOTE ADULT - PROBLEM SELECTOR PLAN 5
History of diabetes managed at home on metformin 500 BID.  - HbA1c   - hold metformin in the inpatient setting  - SSI   - FSG
History of diabetes managed at home on metformin 500 BID.  - HbA1c   - hold metformin in the inpatient setting  - SSI   - FSG

## 2022-11-01 NOTE — PROGRESS NOTE ADULT - ATTENDING COMMENTS
72M with PMH of HTN, HLD, DM 2, CKD, gout, prostate cancer s/p prostatectomy, R inguinal hernia repair 7/2022 presenting for left lower quadrant abdominal pain 2/2 recurrent diverticulitis.  Pain now mostly resolved. Planning for discharge home on oral antibiotics and GI f/u for repeat colonoscopy in 6-8 weeks.     # Diverticulitis  - CT here consistent with acute diverticulitis with no gas or fluid collection  - transition to oral antibiotics - will use cefpodoxime + flagyl (fluoroquinolone allergy)  - patient cancelled his colonoscopy that was planned for Monday; He expressed understanding need to reschedule colonoscopy for 6-8 weeks    d/c home today. d/c planning 35 min coordinating care
72M with PMH of HTN, HLD, DM 2, CKD, gout, prostate cancer s/p prostatectomy, R inguinal hernia repair 7/2022 presenting for left lower quadrant abdominal pain 2/2 recurrent diverticulitis.     #Diverticulitis  - had prior episode 2 months ago and was planned for colonoscopy next week  - CT here consistent with acute diverticulitis with no gas or fluid collection  - will c/w Zosyn for now  - advance diet today as pain significantly improved  - will need to reschedule colonoscopy for 6 weeks     # CKD3 - baseline Cr is 1.5 and currently at baseline, continue to avoid nephrotoxins and monitor Cr.

## 2022-11-01 NOTE — PROGRESS NOTE ADULT - PROBLEM SELECTOR PLAN 2
Presenting with K 5.5/5.8 on repeat s/p lokelma, calcium gluconate 2g and albuterol 3 mL x 3 in ED for stabilization of K. EKG with no signs of T wave abnormalities. Unclear etiology.  - repeat BMP in AM to check for resolution of hyperkalemia  - Potassium levels resolved to date, 10/31/22

## 2022-11-02 RX ORDER — METRONIDAZOLE 500 MG
1 TABLET ORAL
Qty: 30 | Refills: 0
Start: 2022-11-02 | End: 2022-11-11

## 2022-11-02 RX ORDER — CEFPODOXIME PROXETIL 100 MG
1 TABLET ORAL
Qty: 20 | Refills: 0
Start: 2022-11-02 | End: 2022-11-11

## 2022-11-02 RX ORDER — METRONIDAZOLE 500 MG
1 TABLET ORAL
Qty: 36 | Refills: 0
Start: 2022-11-02 | End: 2022-11-13

## 2022-11-02 RX ORDER — CEFPODOXIME PROXETIL 100 MG
1 TABLET ORAL
Qty: 24 | Refills: 0
Start: 2022-11-02 | End: 2022-11-13

## 2023-03-26 NOTE — H&P PST ADULT - PRO PAIN EXPRESSION
Ears: no ear pain and no hearing problems. Nose: no nasal congestion and no nasal drainage. Mouth/Throat: no dysphagia, no hoarseness and no throat pain. Neck: no lumps, no pain, no stiffness and no swollen glands.
verbalization

## 2023-03-29 NOTE — H&P PST ADULT - PROBLEM SELECTOR PLAN 1
Pt arrived to the ED c/o right leg pain since January 28th. Patient has no numbness or tingling. Pt has a sizeable bruise to the right posterior calf. pcp sent patient to the ED to rule out dvt. Pt is on eliquis  
left shoulder arthroscopy on 2/22/2019. No NSAIDs 1 week before surgery.  Advised to take Tylenol as needed for pain.

## 2023-06-11 NOTE — H&P PST ADULT - BSA (M2)
Anesthesia Start and Stop Event Times     Date Time Event    6/10/2023 1856 Ready for Procedure     1914 Anesthesia Start     2005 Anesthesia Stop        Responsible Staff  06/10/23    Name Role Begin End    Thomas Matson M.D. Anesth 1914 2005        Overtime Reason:  no overtime (within assigned shift)    Comments:                                                      
2.33

## 2023-11-18 ENCOUNTER — APPOINTMENT (OUTPATIENT)
Dept: CT IMAGING | Facility: IMAGING CENTER | Age: 73
End: 2023-11-18
Payer: MEDICARE

## 2023-11-18 ENCOUNTER — OUTPATIENT (OUTPATIENT)
Dept: OUTPATIENT SERVICES | Facility: HOSPITAL | Age: 73
LOS: 1 days | End: 2023-11-18
Payer: MEDICARE

## 2023-11-18 DIAGNOSIS — Z98.890 OTHER SPECIFIED POSTPROCEDURAL STATES: Chronic | ICD-10-CM

## 2023-11-18 DIAGNOSIS — Z90.79 ACQUIRED ABSENCE OF OTHER GENITAL ORGAN(S): Chronic | ICD-10-CM

## 2023-11-18 DIAGNOSIS — Z00.8 ENCOUNTER FOR OTHER GENERAL EXAMINATION: ICD-10-CM

## 2023-11-18 PROCEDURE — 74176 CT ABD & PELVIS W/O CONTRAST: CPT | Mod: 26,MH

## 2023-11-18 PROCEDURE — 74176 CT ABD & PELVIS W/O CONTRAST: CPT | Mod: MH

## 2023-12-20 ENCOUNTER — APPOINTMENT (OUTPATIENT)
Dept: ULTRASOUND IMAGING | Facility: IMAGING CENTER | Age: 73
End: 2023-12-20
Payer: MEDICARE

## 2023-12-20 ENCOUNTER — OUTPATIENT (OUTPATIENT)
Dept: OUTPATIENT SERVICES | Facility: HOSPITAL | Age: 73
LOS: 1 days | End: 2023-12-20
Payer: MEDICARE

## 2023-12-20 DIAGNOSIS — Z90.79 ACQUIRED ABSENCE OF OTHER GENITAL ORGAN(S): Chronic | ICD-10-CM

## 2023-12-20 DIAGNOSIS — Z98.890 OTHER SPECIFIED POSTPROCEDURAL STATES: Chronic | ICD-10-CM

## 2023-12-20 DIAGNOSIS — Z00.8 ENCOUNTER FOR OTHER GENERAL EXAMINATION: ICD-10-CM

## 2023-12-20 PROCEDURE — 76770 US EXAM ABDO BACK WALL COMP: CPT

## 2023-12-20 PROCEDURE — 76770 US EXAM ABDO BACK WALL COMP: CPT | Mod: 26

## 2024-04-17 NOTE — H&P PST ADULT - FUNCTIONAL ASSESSMENT - BASIC MOBILITY PT AGE POP HIDDEN
Pt d/c to home. Pt walked to lobby with , denied being escorted to lobby. IV removed per policy and procedure, occlusive dressing applied. AVS reviewed with patient. Pt left with all belongings.    Adult

## 2024-11-05 ENCOUNTER — INPATIENT (INPATIENT)
Facility: HOSPITAL | Age: 74
LOS: 1 days | Discharge: HOME HEALTH | End: 2024-11-07
Attending: HOSPITALIST | Admitting: HOSPITALIST
Payer: MEDICARE

## 2024-11-05 VITALS
SYSTOLIC BLOOD PRESSURE: 129 MMHG | OXYGEN SATURATION: 96 % | TEMPERATURE: 98 F | HEART RATE: 84 BPM | DIASTOLIC BLOOD PRESSURE: 76 MMHG | WEIGHT: 214.95 LBS | RESPIRATION RATE: 18 BRPM

## 2024-11-05 DIAGNOSIS — Z90.79 ACQUIRED ABSENCE OF OTHER GENITAL ORGAN(S): Chronic | ICD-10-CM

## 2024-11-05 DIAGNOSIS — Z29.9 ENCOUNTER FOR PROPHYLACTIC MEASURES, UNSPECIFIED: ICD-10-CM

## 2024-11-05 DIAGNOSIS — Z98.890 OTHER SPECIFIED POSTPROCEDURAL STATES: Chronic | ICD-10-CM

## 2024-11-05 DIAGNOSIS — E78.5 HYPERLIPIDEMIA, UNSPECIFIED: ICD-10-CM

## 2024-11-05 DIAGNOSIS — N17.9 ACUTE KIDNEY FAILURE, UNSPECIFIED: ICD-10-CM

## 2024-11-05 DIAGNOSIS — E11.9 TYPE 2 DIABETES MELLITUS WITHOUT COMPLICATIONS: ICD-10-CM

## 2024-11-05 DIAGNOSIS — E87.1 HYPO-OSMOLALITY AND HYPONATREMIA: ICD-10-CM

## 2024-11-05 DIAGNOSIS — M10.9 GOUT, UNSPECIFIED: ICD-10-CM

## 2024-11-05 DIAGNOSIS — K57.92 DIVERTICULITIS OF INTESTINE, PART UNSPECIFIED, WITHOUT PERFORATION OR ABSCESS WITHOUT BLEEDING: ICD-10-CM

## 2024-11-05 DIAGNOSIS — K57.32 DIVERTICULITIS OF LARGE INTESTINE WITHOUT PERFORATION OR ABSCESS WITHOUT BLEEDING: ICD-10-CM

## 2024-11-05 DIAGNOSIS — I10 ESSENTIAL (PRIMARY) HYPERTENSION: ICD-10-CM

## 2024-11-05 LAB
ADD ON TEST-SPECIMEN IN LAB: SIGNIFICANT CHANGE UP
ALBUMIN SERPL ELPH-MCNC: 4.1 G/DL — SIGNIFICANT CHANGE UP (ref 3.3–5)
ALP SERPL-CCNC: 87 U/L — SIGNIFICANT CHANGE UP (ref 40–120)
ALT FLD-CCNC: 15 U/L — SIGNIFICANT CHANGE UP (ref 4–41)
ANION GAP SERPL CALC-SCNC: 12 MMOL/L — SIGNIFICANT CHANGE UP (ref 7–14)
APPEARANCE UR: CLEAR — SIGNIFICANT CHANGE UP
AST SERPL-CCNC: 13 U/L — SIGNIFICANT CHANGE UP (ref 4–40)
BASOPHILS # BLD AUTO: 0.02 K/UL — SIGNIFICANT CHANGE UP (ref 0–0.2)
BASOPHILS NFR BLD AUTO: 0.2 % — SIGNIFICANT CHANGE UP (ref 0–2)
BILIRUB SERPL-MCNC: 0.7 MG/DL — SIGNIFICANT CHANGE UP (ref 0.2–1.2)
BILIRUB UR-MCNC: NEGATIVE — SIGNIFICANT CHANGE UP
BLOOD GAS VENOUS COMPREHENSIVE RESULT: SIGNIFICANT CHANGE UP
BUN SERPL-MCNC: 34 MG/DL — HIGH (ref 7–23)
CALCIUM SERPL-MCNC: 9.8 MG/DL — SIGNIFICANT CHANGE UP (ref 8.4–10.5)
CHLORIDE SERPL-SCNC: 96 MMOL/L — LOW (ref 98–107)
CO2 SERPL-SCNC: 24 MMOL/L — SIGNIFICANT CHANGE UP (ref 22–31)
COLOR SPEC: YELLOW — SIGNIFICANT CHANGE UP
CREAT SERPL-MCNC: 1.9 MG/DL — HIGH (ref 0.5–1.3)
CRP SERPL-MCNC: 56.6 MG/L — HIGH
DIFF PNL FLD: NEGATIVE — SIGNIFICANT CHANGE UP
EGFR: 36 ML/MIN/1.73M2 — LOW
EOSINOPHIL # BLD AUTO: 0.13 K/UL — SIGNIFICANT CHANGE UP (ref 0–0.5)
EOSINOPHIL NFR BLD AUTO: 1.3 % — SIGNIFICANT CHANGE UP (ref 0–6)
ERYTHROCYTE [SEDIMENTATION RATE] IN BLOOD: 7 MM/HR — SIGNIFICANT CHANGE UP (ref 1–15)
GLUCOSE BLDC GLUCOMTR-MCNC: 222 MG/DL — HIGH (ref 70–99)
GLUCOSE BLDC GLUCOMTR-MCNC: 266 MG/DL — HIGH (ref 70–99)
GLUCOSE BLDC GLUCOMTR-MCNC: 314 MG/DL — HIGH (ref 70–99)
GLUCOSE SERPL-MCNC: 369 MG/DL — HIGH (ref 70–99)
GLUCOSE UR QL: >=1000 MG/DL
HCT VFR BLD CALC: 41.1 % — SIGNIFICANT CHANGE UP (ref 39–50)
HGB BLD-MCNC: 13.5 G/DL — SIGNIFICANT CHANGE UP (ref 13–17)
IANC: 7.08 K/UL — SIGNIFICANT CHANGE UP (ref 1.8–7.4)
IMM GRANULOCYTES NFR BLD AUTO: 0.4 % — SIGNIFICANT CHANGE UP (ref 0–0.9)
KETONES UR-MCNC: NEGATIVE MG/DL — SIGNIFICANT CHANGE UP
LEUKOCYTE ESTERASE UR-ACNC: NEGATIVE — SIGNIFICANT CHANGE UP
LIDOCAIN IGE QN: 85 U/L — HIGH (ref 7–60)
LYMPHOCYTES # BLD AUTO: 1.86 K/UL — SIGNIFICANT CHANGE UP (ref 1–3.3)
LYMPHOCYTES # BLD AUTO: 18.7 % — SIGNIFICANT CHANGE UP (ref 13–44)
MAGNESIUM SERPL-MCNC: 2.1 MG/DL — SIGNIFICANT CHANGE UP (ref 1.6–2.6)
MCHC RBC-ENTMCNC: 28.1 PG — SIGNIFICANT CHANGE UP (ref 27–34)
MCHC RBC-ENTMCNC: 32.8 G/DL — SIGNIFICANT CHANGE UP (ref 32–36)
MCV RBC AUTO: 85.4 FL — SIGNIFICANT CHANGE UP (ref 80–100)
MONOCYTES # BLD AUTO: 0.81 K/UL — SIGNIFICANT CHANGE UP (ref 0–0.9)
MONOCYTES NFR BLD AUTO: 8.1 % — SIGNIFICANT CHANGE UP (ref 2–14)
NEUTROPHILS # BLD AUTO: 7.08 K/UL — SIGNIFICANT CHANGE UP (ref 1.8–7.4)
NEUTROPHILS NFR BLD AUTO: 71.3 % — SIGNIFICANT CHANGE UP (ref 43–77)
NITRITE UR-MCNC: NEGATIVE — SIGNIFICANT CHANGE UP
NRBC # BLD: 0 /100 WBCS — SIGNIFICANT CHANGE UP (ref 0–0)
NRBC # FLD: 0 K/UL — SIGNIFICANT CHANGE UP (ref 0–0)
PH UR: 6 — SIGNIFICANT CHANGE UP (ref 5–8)
PHOSPHATE SERPL-MCNC: 2.7 MG/DL — SIGNIFICANT CHANGE UP (ref 2.5–4.5)
PLATELET # BLD AUTO: 176 K/UL — SIGNIFICANT CHANGE UP (ref 150–400)
POTASSIUM SERPL-MCNC: 5.1 MMOL/L — SIGNIFICANT CHANGE UP (ref 3.5–5.3)
POTASSIUM SERPL-SCNC: 5.1 MMOL/L — SIGNIFICANT CHANGE UP (ref 3.5–5.3)
PROT SERPL-MCNC: 7.7 G/DL — SIGNIFICANT CHANGE UP (ref 6–8.3)
PROT UR-MCNC: 30 MG/DL
RBC # BLD: 4.81 M/UL — SIGNIFICANT CHANGE UP (ref 4.2–5.8)
RBC # FLD: 12.6 % — SIGNIFICANT CHANGE UP (ref 10.3–14.5)
SODIUM SERPL-SCNC: 132 MMOL/L — LOW (ref 135–145)
SP GR SPEC: 1.03 — SIGNIFICANT CHANGE UP (ref 1–1.03)
UROBILINOGEN FLD QL: 0.2 MG/DL — SIGNIFICANT CHANGE UP (ref 0.2–1)
WBC # BLD: 9.94 K/UL — SIGNIFICANT CHANGE UP (ref 3.8–10.5)
WBC # FLD AUTO: 9.94 K/UL — SIGNIFICANT CHANGE UP (ref 3.8–10.5)

## 2024-11-05 PROCEDURE — 99285 EMERGENCY DEPT VISIT HI MDM: CPT

## 2024-11-05 PROCEDURE — 99223 1ST HOSP IP/OBS HIGH 75: CPT

## 2024-11-05 RX ORDER — OLMESARTAN MEDOXOMIL 20 MG/1
80 TABLET, FILM COATED ORAL
Refills: 0 | DISCHARGE

## 2024-11-05 RX ORDER — AMPICILLIN AND SULBACTAM 2; 1 G/1; G/1
3 INJECTION, POWDER, FOR SOLUTION INTRAMUSCULAR; INTRAVENOUS ONCE
Refills: 0 | Status: COMPLETED | OUTPATIENT
Start: 2024-11-05 | End: 2024-11-05

## 2024-11-05 RX ORDER — INFLUENZ VIR VAC TV P-SURF2003 15MCG/.5ML
0.5 SYRINGE (ML) INTRAMUSCULAR ONCE
Refills: 0 | Status: DISCONTINUED | OUTPATIENT
Start: 2024-11-05 | End: 2024-11-07

## 2024-11-05 RX ORDER — CEFTRIAXONE SODIUM 10 G
1000 VIAL (EA) INJECTION EVERY 24 HOURS
Refills: 0 | Status: DISCONTINUED | OUTPATIENT
Start: 2024-11-05 | End: 2024-11-05

## 2024-11-05 RX ORDER — INSULIN LISPRO 100/ML
VIAL (ML) SUBCUTANEOUS
Refills: 0 | Status: DISCONTINUED | OUTPATIENT
Start: 2024-11-05 | End: 2024-11-07

## 2024-11-05 RX ORDER — GLUCAGON INJECTION, SOLUTION 1 MG/.2ML
1 INJECTION, SOLUTION SUBCUTANEOUS ONCE
Refills: 0 | Status: DISCONTINUED | OUTPATIENT
Start: 2024-11-05 | End: 2024-11-07

## 2024-11-05 RX ORDER — CHOLECALCIFEROL (VITAMIN D3) 625 MCG
2000 CAPSULE ORAL DAILY
Refills: 0 | Status: DISCONTINUED | OUTPATIENT
Start: 2024-11-05 | End: 2024-11-07

## 2024-11-05 RX ORDER — AMLODIPINE BESYLATE 10 MG
10 TABLET ORAL DAILY
Refills: 0 | Status: DISCONTINUED | OUTPATIENT
Start: 2024-11-05 | End: 2024-11-06

## 2024-11-05 RX ORDER — B-COMPLEX WITH VITAMIN C
1 VIAL (ML) INJECTION DAILY
Refills: 0 | Status: DISCONTINUED | OUTPATIENT
Start: 2024-11-05 | End: 2024-11-07

## 2024-11-05 RX ORDER — INSULIN LISPRO 100/ML
4 VIAL (ML) SUBCUTANEOUS ONCE
Refills: 0 | Status: DISCONTINUED | OUTPATIENT
Start: 2024-11-05 | End: 2024-11-05

## 2024-11-05 RX ORDER — ENOXAPARIN SODIUM 40MG/0.4ML
40 SYRINGE (ML) SUBCUTANEOUS EVERY 24 HOURS
Refills: 0 | Status: DISCONTINUED | OUTPATIENT
Start: 2024-11-05 | End: 2024-11-07

## 2024-11-05 RX ORDER — CEFTRIAXONE SODIUM 10 G
1000 VIAL (EA) INJECTION EVERY 24 HOURS
Refills: 0 | Status: DISCONTINUED | OUTPATIENT
Start: 2024-11-05 | End: 2024-11-07

## 2024-11-05 RX ORDER — ACETAMINOPHEN 500 MG
650 TABLET ORAL EVERY 6 HOURS
Refills: 0 | Status: DISCONTINUED | OUTPATIENT
Start: 2024-11-05 | End: 2024-11-07

## 2024-11-05 RX ORDER — ONDANSETRON HYDROCHLORIDE 2 MG/ML
4 INJECTION, SOLUTION INTRAMUSCULAR; INTRAVENOUS EVERY 8 HOURS
Refills: 0 | Status: DISCONTINUED | OUTPATIENT
Start: 2024-11-05 | End: 2024-11-07

## 2024-11-05 RX ORDER — INSULIN LISPRO 100/ML
VIAL (ML) SUBCUTANEOUS AT BEDTIME
Refills: 0 | Status: DISCONTINUED | OUTPATIENT
Start: 2024-11-05 | End: 2024-11-07

## 2024-11-05 RX ORDER — MELATONIN 5 MG
3 TABLET ORAL AT BEDTIME
Refills: 0 | Status: DISCONTINUED | OUTPATIENT
Start: 2024-11-05 | End: 2024-11-07

## 2024-11-05 RX ORDER — EZETIMIBE 10 MG/1
1 TABLET ORAL
Refills: 0 | DISCHARGE

## 2024-11-05 RX ORDER — EZETIMIBE 10 MG/1
10 TABLET ORAL DAILY
Refills: 0 | Status: DISCONTINUED | OUTPATIENT
Start: 2024-11-05 | End: 2024-11-07

## 2024-11-05 RX ORDER — METRONIDAZOLE 250 MG/1
500 TABLET ORAL EVERY 8 HOURS
Refills: 0 | Status: DISCONTINUED | OUTPATIENT
Start: 2024-11-05 | End: 2024-11-07

## 2024-11-05 RX ORDER — ACETAMINOPHEN 500 MG
1000 TABLET ORAL ONCE
Refills: 0 | Status: COMPLETED | OUTPATIENT
Start: 2024-11-05 | End: 2024-11-05

## 2024-11-05 RX ORDER — SODIUM CHLORIDE 9 MG/ML
1000 INJECTION, SOLUTION INTRAMUSCULAR; INTRAVENOUS; SUBCUTANEOUS ONCE
Refills: 0 | Status: COMPLETED | OUTPATIENT
Start: 2024-11-05 | End: 2024-11-05

## 2024-11-05 RX ADMIN — Medication 75 MILLILITER(S): at 19:58

## 2024-11-05 RX ADMIN — SODIUM CHLORIDE 2000 MILLILITER(S): 9 INJECTION, SOLUTION INTRAMUSCULAR; INTRAVENOUS; SUBCUTANEOUS at 16:20

## 2024-11-05 RX ADMIN — Medication 1000 MILLIGRAM(S): at 19:28

## 2024-11-05 RX ADMIN — Medication 100 MILLIGRAM(S): at 22:08

## 2024-11-05 RX ADMIN — Medication 40 MILLIGRAM(S): at 22:07

## 2024-11-05 RX ADMIN — AMPICILLIN AND SULBACTAM 3 GRAM(S): 2; 1 INJECTION, POWDER, FOR SOLUTION INTRAMUSCULAR; INTRAVENOUS at 19:29

## 2024-11-05 RX ADMIN — Medication 400 MILLIGRAM(S): at 16:19

## 2024-11-05 RX ADMIN — Medication 40 MILLIGRAM(S): at 22:08

## 2024-11-05 RX ADMIN — Medication 75 MILLILITER(S): at 22:08

## 2024-11-05 RX ADMIN — AMPICILLIN AND SULBACTAM 200 GRAM(S): 2; 1 INJECTION, POWDER, FOR SOLUTION INTRAMUSCULAR; INTRAVENOUS at 16:20

## 2024-11-05 RX ADMIN — Medication 3: at 19:58

## 2024-11-05 RX ADMIN — METRONIDAZOLE 100 MILLIGRAM(S): 250 TABLET ORAL at 22:07

## 2024-11-05 RX ADMIN — SODIUM CHLORIDE 1000 MILLILITER(S): 9 INJECTION, SOLUTION INTRAMUSCULAR; INTRAVENOUS; SUBCUTANEOUS at 19:32

## 2024-11-05 NOTE — H&P ADULT - PROBLEM SELECTOR PLAN 1
- acute  - Op CT imaging on 11/5/24 AM showing: "acute sigmoid diverticulitis with signficant wall thickening and pericolonic edema. There is no abscess, no perforation, no bowel obstruction."   - has had prior diverticulitis in June this year, 4th incident overall (first in 2022). No recent surgeries.   - was on PO amoxicillin op, sp unasyn in the ED.   - s/w CTX and metronidazole  - trend infl markers  - CLD for now (no pain with eating per pt), advance as tolerated  - hydration with IVF at 75cc/hr for 1 L and reassess   - pain only present on palpation, otherwise tolerable. tylenol prn for now for now.     #Non obstructing kidney stone 1mm R upper pole  #Multiple Kidney cysts   - incidental finding on OP CT. Results reviewed with pt. monitor.

## 2024-11-05 NOTE — PATIENT PROFILE ADULT - OVER THE PAST TWO WEEKS, HAVE YOU FELT LITTLE INTEREST OR PLEASURE IN DOING THINGS?
Spoke with patient regarding new prescription for Eliquis. ID verified using two patient identifiers. Orders placed for free local 30 day trial offer as well as mail order prescription.Pt to start dosing today. States he has taken Eliquis before and expresses understanding.    Opportunities for questions, clarifications, and concerns provided.     no

## 2024-11-05 NOTE — ED PROVIDER NOTE - CONSTITUTIONAL, MLM
normal... Well appearing, awake, alert, oriented to person, place, time/situation and in no apparent distress. Graft Donor Site Bandage (Optional-Leave Blank If You Don't Want In Note): a pressure bandage was applied to the donor site.

## 2024-11-05 NOTE — ED ADULT NURSE REASSESSMENT NOTE - NS ED NURSE REASSESS COMMENT FT1
Per Medicine inpatient coverage: pt  currently - to receive 3u SQ insulin coverage as per sliding scale, recheck BGL at 2300 and cover if needed per sliding scale.
Attempt to call report to inpatient unit, states will call back

## 2024-11-05 NOTE — H&P ADULT - HISTORY OF PRESENT ILLNESS
Mr. Austin is a 75 y/o M with PMH of prostate cancer (sp prostatectomy), HTN, HLD, NIDDTM2, Hx of diverticulitis, Gout who presented from home after OP CT scan without IV contrast from today notable for findings of acute sigmoid diverticulitis. Pt here with daughter Suyapa and reports he has been having LLQ abd pain for past 1-2 weeks, on-off but became more severe on Sunday, prompting him to go to his PCP for further eval. He was given amoxicillin 500mg BID and took 4x doses from sunday to tuesday am and went for CT scan which confirmed findings of acute sigmoid diverticulitis and came to the ED for further care at the direction of his PCP. Reports abd pain only when palpating, not severe/constant, reports being able to eat (had chicken this AM) and without nausea/vomiting, fevers, chills, diarrhea, constipation, fevers, chills, sob, muscle/joint aches at home. no sick contacts, recent surgeries, or new medications. reports overall condition to be slightly improving since sunday. +pass gas.     In the ED, HDS, afebrile. received IV Unasyn 3g x1, IV NS bolus x1, IV acetaminophen x1. Labs notable for no leukocytosis, Na 132, Cr 1.9, , Mg 2.1, Lipase mildly elevated at 85, VBG = 7.31/57/24/29, lactate wnl. UA notable for proteinuria, +BG otherwise no nitrates, leukocytes, bacteria.     Admitted to medicine for further management.

## 2024-11-05 NOTE — PATIENT PROFILE ADULT - FALL HARM RISK - RISK INTERVENTIONS

## 2024-11-05 NOTE — H&P ADULT - PROBLEM SELECTOR PLAN 8
- Diet: CLD, advance as tolerated   - DVT PPx: Lovenox  - Dispo: Inpatient    c/w home vitamin d and MVI

## 2024-11-05 NOTE — ED ADULT NURSE NOTE - OBJECTIVE STATEMENT
Pt. presents to 29 c.o LLQ abd pain had out patient CT that showed diverticulitis has been taking PO antibiotics w/ out relief. Pt. states MD sent him in for IV antibiotics. Denies CP SOB n/v/d dysuria fever/chills. Pt. presents to 29 c.o LLQ abd pain had out patient CT that showed diverticulitis has been taking PO antibiotics w/ out relief. Pt. states MD sent him in for IV antibiotics. Denies CP SOB n/v/d dysuria fever/chills. PMHx T2DM HTN Gout, diverticulitis. RAC20G labs sent medicated per order. pending lab res

## 2024-11-05 NOTE — H&P ADULT - NSHPLABSRESULTS_GEN_ALL_CORE
LABS:                         13.5   9.94  )-----------( 176      ( 2024 16:10 )             41.1         132[L]  |  96[L]  |  34[H]  ----------------------------<  369[H]  5.1   |  24  |  1.90[H]    Ca    9.8      2024 16:10  Phos  2.7     -  Mg     2.10         TPro  7.7  /  Alb  4.1  /  TBili  0.7  /  DBili  x   /  AST  13  /  ALT  15  /  AlkPhos  87  -      Urinalysis Basic - ( 2024 16:10 )    Color: Yellow / Appearance: Clear / S.027 / pH: x  Gluc: 369 mg/dL / Ketone: Negative mg/dL  / Bili: Negative / Urobili: 0.2 mg/dL   Blood: x / Protein: 30 mg/dL / Nitrite: Negative   Leuk Esterase: Negative / RBC: 0 /HPF / WBC 0 /HPF   Sq Epi: x / Non Sq Epi: 0 /HPF / Bacteria: Negative /HPF                  Urinalysis with Rflx Culture (collected 24 @ 16:10)        RADIOLOGY, EKG & ADDITIONAL TESTS: Reviewed.

## 2024-11-05 NOTE — ED PROVIDER NOTE - CLINICAL SUMMARY MEDICAL DECISION MAKING FREE TEXT BOX
This is a 74-year-old male with a past medical history of diverticulitis hypertension hyperlipidemia diabetes presented to the ED for diverticulitis.  Patient states that he had a CAT scan earlier today and was sent in by his PMD for having diverticulitis. diverticulitlis- will do labs, fluids, abx and reassess

## 2024-11-05 NOTE — H&P ADULT - PROBLEM SELECTOR PLAN 3
- Cr 1.9 on admission, previous lab work in 2022 showing Cr 1.6. No other recent labwork to compare with.   - On IVF for diverticulitis   - monitor Cr closely  - hold home omlesartan and allopurinol for now i/s/o of precious  - renally dose meds based on egfr  - avoid nsaids, nephrotoxic agents

## 2024-11-05 NOTE — H&P ADULT - PROBLEM SELECTOR PLAN 7
- chronic  - on allopurinol 100mg qd   - hold for now given presumed KAELYN, resume the soonest as able to clinically

## 2024-11-05 NOTE — H&P ADULT - ASSESSMENT
Mr. Austin is a 73 y/o M with PMH of prostate cancer (sp prostatectomy), HTN, HLD, NIDDTM2, Hx of diverticulitis, Gout who presented from home after OP CT scan without IV contrast from today notable for findings of acute sigmoid diverticulitis. Admitted for further management with IV abx.

## 2024-11-05 NOTE — ED PROVIDER NOTE - OBJECTIVE STATEMENT
This is a 74-year-old male with a past medical history of diverticulitis hypertension hyperlipidemia diabetes presented to the ED for diverticulitis.  Patient states that he had a CAT scan earlier today and was sent in by his PMD for having diverticulitis.  He states that this was his fourth time having diverticulitis.  He denies having any diarrhea or constipation no nausea or vomiting no chest pain or shortness of breath just admits having pain in the lower abdomen that is been on for several days.  No recent surgeries however has had her hernia repair in the past no alcohol or drug use.  States that he is compliant with his diabetes medications. Patient has CT results showing that he has diverticulitis. This is a 74-year-old male with a past medical history of diverticulitis hypertension hyperlipidemia diabetes presented to the ED for diverticulitis.  Patient states that he had a CAT scan earlier today and was sent in by his PMD for having diverticulitis.  He states that this was his fourth time having diverticulitis.  He denies having any diarrhea or constipation no nausea or vomiting no chest pain or shortness of breath just admits having pain in the lower abdomen that is been on for several days.  No recent surgeries however has had her hernia repair in the past no alcohol or drug use.  States that he is compliant with his diabetes medications. Patient has CT results showing that he has diverticulitis.    Attendin-year-old male presents with left lower quadrant abdominal pain for 1 to 2 weeks.  Outside CT scan today showed uncomplicated diverticulitis.  Patient has had this multiple times before.  He was sent in for admission for IV antibiotics by his PCP.

## 2024-11-05 NOTE — ED ADULT TRIAGE NOTE - CHIEF COMPLAINT QUOTE
Patient came in with the complaints of abdominal pain. Patient stated he had CT scan done this morning and that shows infection in stomach and his PMD sent him to ED for I/V antibiotics. Patient stated he is on Oral antibiotics amoxicillin 500mg twice a day since this Sunday. No other complaints. Denies Nausea/ vomiting/ diarrhea/f ever. Past Medical History:- HTN, DM, Gout

## 2024-11-05 NOTE — H&P ADULT - PROBLEM SELECTOR PLAN 5
- chronic  - on olmesartan and amlodipine at home  - hold ARB i/s/o of precious  - c/w home amlodipine

## 2024-11-05 NOTE — H&P ADULT - PROBLEM SELECTOR PLAN 4
- elevated BG on admission in 300s. Reports only being on metformin 500mg qd. UA notable for proteinuria and glucousria  - sp 1 L NS bolus in ED  - given elevated FS, repeat FS and give x1 dose of lispro for now  - ISS for now with bedtime correction. consider basal bolus insulin regimen based on trend. suspect some contributing factor of active infection also in elevated BG as well.   - check Hba1c  - monitor BG closely with inpatient goal 140-180

## 2024-11-05 NOTE — ED ADULT NURSE NOTE - NSFALLUNIVINTERV_ED_ALL_ED
Bed/Stretcher in lowest position, wheels locked, appropriate side rails in place/Call bell, personal items and telephone in reach/Instruct patient to call for assistance before getting out of bed/chair/stretcher/Non-slip footwear applied when patient is off stretcher/[place] to call system/Physically safe environment - no spills, clutter or unnecessary equipment/Purposeful proactive rounding/Room/bathroom lighting operational, light cord in reach

## 2024-11-05 NOTE — H&P ADULT - NSHPPHYSICALEXAM_GEN_ALL_CORE
Vitals:  Vital Signs Last 24 Hrs  T(C): 36.9 (05 Nov 2024 14:44), Max: 36.9 (05 Nov 2024 14:44)  T(F): 98.4 (05 Nov 2024 14:44), Max: 98.4 (05 Nov 2024 14:44)  HR: 84 (05 Nov 2024 14:44) (84 - 84)  BP: 129/76 (05 Nov 2024 14:44) (129/76 - 129/76)  BP(mean): --  RR: 18 (05 Nov 2024 14:44) (18 - 18)  SpO2: 96% (05 Nov 2024 14:44) (96% - 96%)      CAPILLARY BLOOD GLUCOSE      POCT Blood Glucose.: 314 mg/dL (05 Nov 2024 17:59)  POCT Blood Glucose.: 374 mg/dL (05 Nov 2024 14:52)      PHYSICAL EXAM:  GENERAL: NAD, lying in bed comfortably, wearing glasses, not in pain, talking in full sentences without sob   HEENT: NC/AT, EOMI, PERRL, no LAD, MMM, no pharyngeal erythema   NECK: Supple, No JVD  CHEST/LUNG: CTAB, no increased WOB  HEART: RRR, no m/r/g  ABDOMEN: soft, tenderness to deep palpation in LLE, no rebound tenderness, +BS, old surgical scar noted in RLQ area   EXTREMITIES:  2+ peripheral pulses, no clubbing, no edema  NERVOUS SYSTEM:  A&Ox3, no focal deficits  MSK: FROM all 4 extremities, full and equal strength  SKIN: No overt rashes or lesions appreciated

## 2024-11-05 NOTE — H&P ADULT - PROBLEM SELECTOR PLAN 2
- Na 132 on admission, new recent labs to compare prior values. Per pt, has had previous normal blood work  - mild; check urine osm, serum osm  - bid rfp, monitor closely

## 2024-11-06 ENCOUNTER — TRANSCRIPTION ENCOUNTER (OUTPATIENT)
Age: 74
End: 2024-11-06

## 2024-11-06 DIAGNOSIS — R79.89 OTHER SPECIFIED ABNORMAL FINDINGS OF BLOOD CHEMISTRY: ICD-10-CM

## 2024-11-06 LAB
A1C WITH ESTIMATED AVERAGE GLUCOSE RESULT: 14 % — HIGH (ref 4–5.6)
ALBUMIN SERPL ELPH-MCNC: 3.5 G/DL — SIGNIFICANT CHANGE UP (ref 3.3–5)
ALP SERPL-CCNC: 69 U/L — SIGNIFICANT CHANGE UP (ref 40–120)
ALT FLD-CCNC: 12 U/L — SIGNIFICANT CHANGE UP (ref 4–41)
ANION GAP SERPL CALC-SCNC: 11 MMOL/L — SIGNIFICANT CHANGE UP (ref 7–14)
AST SERPL-CCNC: 12 U/L — SIGNIFICANT CHANGE UP (ref 4–40)
BASOPHILS # BLD AUTO: 0.03 K/UL — SIGNIFICANT CHANGE UP (ref 0–0.2)
BASOPHILS NFR BLD AUTO: 0.5 % — SIGNIFICANT CHANGE UP (ref 0–2)
BILIRUB SERPL-MCNC: 0.6 MG/DL — SIGNIFICANT CHANGE UP (ref 0.2–1.2)
BUN SERPL-MCNC: 25 MG/DL — HIGH (ref 7–23)
CALCIUM SERPL-MCNC: 9.2 MG/DL — SIGNIFICANT CHANGE UP (ref 8.4–10.5)
CHLORIDE SERPL-SCNC: 101 MMOL/L — SIGNIFICANT CHANGE UP (ref 98–107)
CHOLEST SERPL-MCNC: 115 MG/DL — SIGNIFICANT CHANGE UP
CO2 SERPL-SCNC: 22 MMOL/L — SIGNIFICANT CHANGE UP (ref 22–31)
CREAT SERPL-MCNC: 1.57 MG/DL — HIGH (ref 0.5–1.3)
CRP SERPL-MCNC: 70.4 MG/L — HIGH
EGFR: 45 ML/MIN/1.73M2 — LOW
EOSINOPHIL # BLD AUTO: 0.23 K/UL — SIGNIFICANT CHANGE UP (ref 0–0.5)
EOSINOPHIL NFR BLD AUTO: 3.7 % — SIGNIFICANT CHANGE UP (ref 0–6)
ERYTHROCYTE [SEDIMENTATION RATE] IN BLOOD: 43 MM/HR — HIGH (ref 1–15)
ESTIMATED AVERAGE GLUCOSE: 355 — SIGNIFICANT CHANGE UP
GLUCOSE BLDC GLUCOMTR-MCNC: 105 MG/DL — HIGH (ref 70–99)
GLUCOSE BLDC GLUCOMTR-MCNC: 149 MG/DL — HIGH (ref 70–99)
GLUCOSE BLDC GLUCOMTR-MCNC: 157 MG/DL — HIGH (ref 70–99)
GLUCOSE BLDC GLUCOMTR-MCNC: 218 MG/DL — HIGH (ref 70–99)
GLUCOSE BLDC GLUCOMTR-MCNC: 230 MG/DL — HIGH (ref 70–99)
GLUCOSE BLDC GLUCOMTR-MCNC: 251 MG/DL — HIGH (ref 70–99)
GLUCOSE SERPL-MCNC: 215 MG/DL — HIGH (ref 70–99)
HCT VFR BLD CALC: 34.9 % — LOW (ref 39–50)
HDLC SERPL-MCNC: 37 MG/DL — LOW
HGB BLD-MCNC: 11.9 G/DL — LOW (ref 13–17)
IANC: 3.58 K/UL — SIGNIFICANT CHANGE UP (ref 1.8–7.4)
IMM GRANULOCYTES NFR BLD AUTO: 0.2 % — SIGNIFICANT CHANGE UP (ref 0–0.9)
LIPID PNL WITH DIRECT LDL SERPL: 58 MG/DL — SIGNIFICANT CHANGE UP
LYMPHOCYTES # BLD AUTO: 1.62 K/UL — SIGNIFICANT CHANGE UP (ref 1–3.3)
LYMPHOCYTES # BLD AUTO: 26.4 % — SIGNIFICANT CHANGE UP (ref 13–44)
MCHC RBC-ENTMCNC: 28.5 PG — SIGNIFICANT CHANGE UP (ref 27–34)
MCHC RBC-ENTMCNC: 34.1 G/DL — SIGNIFICANT CHANGE UP (ref 32–36)
MCV RBC AUTO: 83.7 FL — SIGNIFICANT CHANGE UP (ref 80–100)
MONOCYTES # BLD AUTO: 0.67 K/UL — SIGNIFICANT CHANGE UP (ref 0–0.9)
MONOCYTES NFR BLD AUTO: 10.9 % — SIGNIFICANT CHANGE UP (ref 2–14)
NEUTROPHILS # BLD AUTO: 3.58 K/UL — SIGNIFICANT CHANGE UP (ref 1.8–7.4)
NEUTROPHILS NFR BLD AUTO: 58.3 % — SIGNIFICANT CHANGE UP (ref 43–77)
NON HDL CHOLESTEROL: 78 MG/DL — SIGNIFICANT CHANGE UP
NRBC # BLD: 0 /100 WBCS — SIGNIFICANT CHANGE UP (ref 0–0)
NRBC # FLD: 0 K/UL — SIGNIFICANT CHANGE UP (ref 0–0)
PLATELET # BLD AUTO: 136 K/UL — LOW (ref 150–400)
POTASSIUM SERPL-MCNC: 4.4 MMOL/L — SIGNIFICANT CHANGE UP (ref 3.5–5.3)
POTASSIUM SERPL-SCNC: 4.4 MMOL/L — SIGNIFICANT CHANGE UP (ref 3.5–5.3)
PROT SERPL-MCNC: 6.5 G/DL — SIGNIFICANT CHANGE UP (ref 6–8.3)
RBC # BLD: 4.17 M/UL — LOW (ref 4.2–5.8)
RBC # FLD: 12.3 % — SIGNIFICANT CHANGE UP (ref 10.3–14.5)
SODIUM SERPL-SCNC: 134 MMOL/L — LOW (ref 135–145)
TRIGL SERPL-MCNC: 100 MG/DL — SIGNIFICANT CHANGE UP
WBC # BLD: 6.14 K/UL — SIGNIFICANT CHANGE UP (ref 3.8–10.5)
WBC # FLD AUTO: 6.14 K/UL — SIGNIFICANT CHANGE UP (ref 3.8–10.5)

## 2024-11-06 PROCEDURE — 99223 1ST HOSP IP/OBS HIGH 75: CPT

## 2024-11-06 PROCEDURE — 99233 SBSQ HOSP IP/OBS HIGH 50: CPT

## 2024-11-06 RX ORDER — INSULIN LISPRO 100/ML
3 VIAL (ML) SUBCUTANEOUS
Refills: 0 | Status: DISCONTINUED | OUTPATIENT
Start: 2024-11-06 | End: 2024-11-07

## 2024-11-06 RX ORDER — ALLOPURINOL 100 MG
100 TABLET ORAL DAILY
Refills: 0 | Status: DISCONTINUED | OUTPATIENT
Start: 2024-11-07 | End: 2024-11-07

## 2024-11-06 RX ORDER — AMLODIPINE BESYLATE 10 MG
10 TABLET ORAL DAILY
Refills: 0 | Status: DISCONTINUED | OUTPATIENT
Start: 2024-11-06 | End: 2024-11-07

## 2024-11-06 RX ORDER — INSULIN GLARGINE,HUM.REC.ANLOG 100/ML
10 VIAL (ML) SUBCUTANEOUS
Qty: 5 | Refills: 0
Start: 2024-11-06

## 2024-11-06 RX ORDER — INSULIN GLARGINE,HUM.REC.ANLOG 100/ML
10 VIAL (ML) SUBCUTANEOUS AT BEDTIME
Refills: 0 | Status: DISCONTINUED | OUTPATIENT
Start: 2024-11-06 | End: 2024-11-07

## 2024-11-06 RX ORDER — BENZOCAINE .06; .7 ML/1; ML/1
0 SWAB TOPICAL
Qty: 100 | Refills: 1
Start: 2024-11-06

## 2024-11-06 RX ADMIN — Medication 1 TABLET(S): at 12:16

## 2024-11-06 RX ADMIN — Medication 3: at 12:18

## 2024-11-06 RX ADMIN — Medication 2: at 08:25

## 2024-11-06 RX ADMIN — METRONIDAZOLE 100 MILLIGRAM(S): 250 TABLET ORAL at 13:41

## 2024-11-06 RX ADMIN — Medication 40 MILLIGRAM(S): at 21:19

## 2024-11-06 RX ADMIN — EZETIMIBE 10 MILLIGRAM(S): 10 TABLET ORAL at 12:17

## 2024-11-06 RX ADMIN — Medication 10 UNIT(S): at 22:26

## 2024-11-06 RX ADMIN — METRONIDAZOLE 100 MILLIGRAM(S): 250 TABLET ORAL at 06:24

## 2024-11-06 RX ADMIN — Medication 3 UNIT(S): at 17:23

## 2024-11-06 RX ADMIN — Medication 2000 UNIT(S): at 12:17

## 2024-11-06 RX ADMIN — Medication 100 MILLIGRAM(S): at 21:20

## 2024-11-06 RX ADMIN — METRONIDAZOLE 100 MILLIGRAM(S): 250 TABLET ORAL at 21:46

## 2024-11-06 RX ADMIN — Medication 10 MILLIGRAM(S): at 16:07

## 2024-11-06 RX ADMIN — Medication 1: at 17:23

## 2024-11-06 NOTE — DISCHARGE NOTE PROVIDER - NSDCCPCAREPLAN_GEN_ALL_CORE_FT
PRINCIPAL DISCHARGE DIAGNOSIS  Diagnosis: Diverticulitis  Assessment and Plan of Treatment: Complete antibiotics.   Please follow-up with PCP/GI as you will likely need colonoscopy on resolution of bout if not up to date.  For mutiple episodes of diverticulitis surgery is sometimes indicated, can follow-up outpatient with Dr. Villalobos.      SECONDARY DISCHARGE DIAGNOSES  Diagnosis: Type 2 diabetes mellitus  Assessment and Plan of Treatment: HbA1c was 14% and goal should be 7% or less.  You were seen by endocrinology.  Avoid sugary diet/foods.  Follow-up with PCP and endocrinology.  Take medications as directed.

## 2024-11-06 NOTE — DISCHARGE NOTE PROVIDER - CARE PROVIDER_API CALL
Yazan Villalobos  Surgery  [address] [place] [address]  Phone: [phone]  Fax: [phone]  Follow Up Time:

## 2024-11-06 NOTE — DISCHARGE NOTE PROVIDER - NSDCMRMEDTOKEN_GEN_ALL_CORE_FT
alcohol swabs: Apply topically to affected area 4 times a day  allopurinol 100 mg oral tablet: 1 tab(s) orally once a day  amLODIPine 10 mg oral tablet: 1 tab(s) orally once a day  amoxicillin 500 mg oral capsule: 1 cap(s) orally 2 times a day  atorvastatin 40 mg oral tablet: 1 tab(s) orally once a day  glucometer (per patient&#x27;s insurance): Test blood sugars four times a day. Dispense #1 glucometer.  glucose tablets: Follow instructions on bottle when sugar is low.  Insulin Pen Needles, 4mm: 1 application subcutaneously once a day. ** Use with insulin pen **  lancets: 1 application subcutaneously 4 times a day  Lantus Solostar Pen 100 units/mL subcutaneous solution: 10 international unit(s) subcutaneous once a day (at bedtime)  metFORMIN 500 mg oral tablet: 1 tab(s) orally once a day  multivitamin: 1  orally once a day  olmesartan: 80 milligram(s) orally once a day  Omega Essentials: 1  orally once a day  test strips (per patient&#x27;s insurance): 1 application subcutaneously 4 times a day. ** Compatible with patient&#x27;s glucometer **  Vitamin B12: 1  orally once a day  Vitamin D3 50 mcg (2000 intl units) oral tablet: 1 tab(s) orally once a day  Zetia 10 mg oral tablet: 1 tab(s) orally once a day   alcohol swabs: Apply topically to affected area 4 times a day  allopurinol 100 mg oral tablet: 1 tab(s) orally once a day  amoxicillin-clavulanate 875 mg-125 mg oral tablet: 1 tab(s) orally 2 times a day  atorvastatin 40 mg oral tablet: 1 tab(s) orally once a day  Freestyle Hilario [place]: Use as directed  Freestyle Hilario Sensors: Use as directed  glucometer (per patient&#x27;s insurance): Test blood sugars four times a day. Dispense #1 glucometer.  glucose tablets: Follow instructions on bottle when sugar is low.  Insulin Pen Needles, 4mm: 1 application subcutaneously once a day. ** Use with insulin pen **  lancets: 1 application subcutaneously 4 times a day  Lantus Solostar Pen 100 units/mL subcutaneous solution: 10 unit(s) subcutaneous once a day (at bedtime)  metFORMIN 500 mg oral tablet: 1 tab(s) orally once a day  Multiple Vitamins oral tablet: 1 tab(s) orally once a day  olmesartan: 80 milligram(s) orally once a day  polyethylene glycol 3350 oral powder for reconstitution: 17 gram(s) orally once a day  test strips (per patient&#x27;s insurance): 1 application subcutaneously 4 times a day. ** Compatible with patient&#x27;s glucometer **  Trulicity Pen 0.75 mg/0.5 mL subcutaneous solution: 0.75 milligram(s) subcutaneously once a week  Vitamin B12: 1 tab(s) orally once a day 1  orally once a day  Vitamin D3 50 mcg (2000 intl units) oral tablet: 1 tab(s) orally once a day  Zetia 10 mg oral tablet: 1 tab(s) orally once a day

## 2024-11-06 NOTE — CONSULT NOTE ADULT - ASSESSMENT
74M pw acute uncomplicated diverticulitis.    Recommendations:  -Continue abx.  -Advance diet as tolerated.  -Will require outpatient follow-up with Dr. Villalobos.    Discussed with Dr. Villalobos.    A Team Surgery  Please page [phone] for all questions.  
75 y/o M with PMH of prostate cancer (sp prostatectomy), HTN, HLD, NIDDTM2, Hx of diverticulitis, Gout who presented from home after OP CT scan without IV contrast from today notable for findings of acute sigmoid diverticulitis. Pt here with daughter Suyapa and reports he has been having LLQ abd pain for past 1-2 weeks, on-off but became more severe on Sunday, prompting him to go to his PCP for further eval. He was given amoxicillin 500mg BID and took 4x doses from sunday to tuesday am and went for CT scan which confirmed findings of acute sigmoid diverticulitis and came to the ED for further care at the direction of his PCP.  Endocrinte team consulted for DM management.     # Stress Hyperglycemia  # Chronic Kidney disease - cautious with insulin titration; high risk for insulin stacking and hypoglycemia  # Non-compliance with diet   # Diabetes Mellitus II   A1c: 14%   Home meds:   mg 1 tab daily  GFR: 45    - Blood glucose target while hospitalized 140-180 mg/dL  - please give abx in fluids other than dextrose   - Start Lantus 10 U HS   - Lispro 3 U with meals   - c/w mod scale with meals and low dose at night   - do not give scheduled prandial insulin if patient is NPO  - please monitor fingerstick blood glucose at least 4 times a day to avoid insulin toxicity, hypoglycemia  - Carbohydrate controlled diet, no concentrated sweets  - Counseled on need for medication adherence to avoid new complications.  - dietician consult   - RN to teach glucometer and insulin    DISCHARGE: h/o prostate CAD and CKD, which is improving. final plan TBD but stop MFM, likely d/c on basal insulin + GLP-1.      # Dyslipidemia: LDL goal < 70, defer to primary team   # Hypertension: BP goal < 130/80, defer to primary team     POC d/w the pt and primary team.     Pt will be seen by another physician tomorrow.  Please either email at [email or web address] or page fellow on call for questions/concerns.      Mary Stauffer MD  Pager: 9AM - 5PM (Mon-Fri): [phone]  After 5PM and on Weekends: [phone]     For nonurgent matters, please email [email or web address] for assistance.   
prenatal chart

## 2024-11-06 NOTE — PROGRESS NOTE ADULT - ASSESSMENT
74M HTN, HLD, gout, prostate cancer (sp prostatectomy),  DM2 on orals, diverticulitis 3-4x,  who presented from home after OP CT scan without IV contrast from today notable for findings of acute sigmoid diverticulitis. Admitted for further management with IV abx.

## 2024-11-06 NOTE — CONSULT NOTE ADULT - SUBJECTIVE AND OBJECTIVE BOX
Reason For Consult:     HPI:  Mr. Austin is a 73 y/o M with PMH of prostate cancer (sp prostatectomy), HTN, HLD, NIDDTM2, Hx of diverticulitis, Gout who presented from home after OP CT scan without IV contrast from today notable for findings of acute sigmoid diverticulitis. Pt here with daughter Suyapa and reports he has been having LLQ abd pain for past 1-2 weeks, on-off but became more severe on Sunday, prompting him to go to his PCP for further eval. He was given amoxicillin 500mg BID and took 4x doses from sunday to tuesday am and went for CT scan which confirmed findings of acute sigmoid diverticulitis and came to the ED for further care at the direction of his PCP. Reports abd pain only when palpating, not severe/constant, reports being able to eat (had chicken this AM) and without nausea/vomiting, fevers, chills, diarrhea, constipation, fevers, chills, sob, muscle/joint aches at home. no sick contacts, recent surgeries, or new medications. reports overall condition to be slightly improving since sunday. +pass gas.     In the ED, HDS, afebrile. received IV Unasyn 3g x1, IV NS bolus x1, IV acetaminophen x1. Labs notable for no leukocytosis, Na 132, Cr 1.9, , Mg 2.1, Lipase mildly elevated at 85, VBG = 7.31/57/24/29, lactate wnl. UA notable for proteinuria, +BG otherwise no nitrates, leukocytes, bacteria.     Admitted to medicine for further management.  (05 Nov 2024 18:45)      PAST MEDICAL & SURGICAL HISTORY:  HTN (hypertension)      HLD (hyperlipidemia)      Gout      Prostate cancer      Pain in left shoulder      Type 2 diabetes mellitus      Inguinal hernia      History of abdominal prostatectomy      History of robot-assisted laparoscopic radical prostatectomy      History of lithotripsy      H/O repair of left rotator cuff      H/O inguinal hernia repair          FAMILY HISTORY:  Cerebrovascular accident (Mother)    Family history of prostate cancer in father (Father)    Family history of throat cancer (Sibling)    Family history of stomach cancer (Sibling)        Social History:    Outpatient Medications:    MEDICATIONS  (STANDING):  amLODIPine   Tablet 10 milliGRAM(s) Oral daily  atorvastatin 40 milliGRAM(s) Oral at bedtime  cefTRIAXone   IVPB 1000 milliGRAM(s) IV Intermittent every 24 hours  cholecalciferol 2000 Unit(s) Oral daily  dextrose 50% Injectable 25 Gram(s) IV Push once  dextrose 50% Injectable 25 Gram(s) IV Push once  dextrose 50% Injectable 12.5 Gram(s) IV Push once  enoxaparin Injectable 40 milliGRAM(s) SubCutaneous every 24 hours  ezetimibe 10 milliGRAM(s) Oral daily  glucagon  Injectable 1 milliGRAM(s) IntraMuscular once  influenza  Vaccine (HIGH DOSE) 0.5 milliLiter(s) IntraMuscular once  insulin lispro (ADMELOG) corrective regimen sliding scale   SubCutaneous three times a day before meals  insulin lispro (ADMELOG) corrective regimen sliding scale   SubCutaneous at bedtime  metroNIDAZOLE  IVPB 500 milliGRAM(s) IV Intermittent every 8 hours  multivitamin 1 Tablet(s) Oral daily    MEDICATIONS  (PRN):  acetaminophen     Tablet .. 650 milliGRAM(s) Oral every 6 hours PRN Temp greater or equal to 38C (100.4F), Mild Pain (1 - 3)  dextrose Oral Gel 15 Gram(s) Oral once PRN Blood Glucose LESS THAN 70 milliGRAM(s)/deciliter  melatonin 3 milliGRAM(s) Oral at bedtime PRN Insomnia  ondansetron Injectable 4 milliGRAM(s) IV Push every 8 hours PRN Nausea and/or Vomiting      Allergies    cipro/quinalin (Unknown)  Cipro (Other)    Intolerances      Review of Systems:  Constitutional: No fever  Eyes: No blurry vision  Neuro: No tremors  HEENT: No pain  Cardiovascular: No chest pain, palpitations  Respiratory: No SOB, no cough  GI: No nausea, vomiting, abdominal pain  : No dysuria  Skin: no rash  Psych: no depression  Endocrine: no polyuria, polydipsia  Hem/lymph: no swelling  Osteoporosis: no fractures    ALL OTHER SYSTEMS REVIEWED AND NEGATIVE    UNABLE TO OBTAIN    PHYSICAL EXAM:  VITALS: T(C): 36.6 (11-06-24 @ 14:23)  T(F): 97.9 (11-06-24 @ 14:23), Max: 98.1 (11-06-24 @ 01:42)  HR: 55 (11-06-24 @ 14:23) (51 - 58)  BP: 118/72 (11-06-24 @ 14:23) (118/72 - 135/76)  RR:  (17 - 18)  SpO2:  (97% - 100%)  Wt(kg): --  GENERAL: NAD, well-groomed, well-developed  EYES: No proptosis, no lid lag, anicteric  HEENT:  Atraumatic, Normocephalic, moist mucous membranes  THYROID: Normal size, no palpable nodules  RESPIRATORY: Clear to auscultation bilaterally; No rales, rhonchi, wheezing, or rubs  CARDIOVASCULAR: Regular rate and rhythm; No murmurs; no peripheral edema  GI: Soft, nontender, non distended, normal bowel sounds  SKIN: Dry, intact, No rashes or lesions  MUSCULOSKELETAL: Full range of motion, normal strength  NEURO: sensation intact, extraocular movements intact, no tremor, normal reflexes  PSYCH: Alert and oriented x 3, normal affect, normal mood  CUSHING'S SIGNS: no striae    POCT Blood Glucose.: 251 mg/dL (11-06-24 @ 12:04)  POCT Blood Glucose.: 230 mg/dL (11-06-24 @ 08:21)  POCT Blood Glucose.: 218 mg/dL (11-06-24 @ 02:59)  POCT Blood Glucose.: 222 mg/dL (11-05-24 @ 21:55)  POCT Blood Glucose.: 266 mg/dL (11-05-24 @ 19:39)  POCT Blood Glucose.: 314 mg/dL (11-05-24 @ 17:59)  POCT Blood Glucose.: 374 mg/dL (11-05-24 @ 14:52)                            11.9   6.14  )-----------( 136      ( 06 Nov 2024 05:12 )             34.9       11-06    134[L]  |  101  |  25[H]  ----------------------------<  215[H]  4.4   |  22  |  1.57[H]    eGFR: 45[L]    Ca    9.2      11-06  Mg     2.10     11-05  Phos  2.7     11-05    TPro  6.5  /  Alb  3.5  /  TBili  0.6  /  DBili  x   /  AST  12  /  ALT  12  /  AlkPhos  69  11-06 11-06 Chol 115 Direct LDL -- LDL calculated 58 HDL 37[L] Trig 100   Reason For Consult: DM     HPI:  Mr. Austin is a 73 y/o M with PMH of prostate cancer (sp prostatectomy), HTN, HLD, NIDDTM2, Hx of diverticulitis, Gout who presented from home after OP CT scan without IV contrast from today notable for findings of acute sigmoid diverticulitis. Pt here with daughter Suyapa and reports he has been having LLQ abd pain for past 1-2 weeks, on-off but became more severe on Sunday, prompting him to go to his PCP for further eval. He was given amoxicillin 500mg BID and took 4x doses from sunday to tuesday am and went for CT scan which confirmed findings of acute sigmoid diverticulitis and came to the ED for further care at the direction of his PCP. Reports abd pain only when palpating, not severe/constant, reports being able to eat (had chicken this AM) and without nausea/vomiting, fevers, chills, diarrhea, constipation, fevers, chills, sob, muscle/joint aches at home. no sick contacts, recent surgeries, or new medications. reports overall condition to be slightly improving since sunday. +pass gas.     In the ED, HDS, afebrile. received IV Unasyn 3g x1, IV NS bolus x1, IV acetaminophen x1. Labs notable for no leukocytosis, Na 132, Cr 1.9, , Mg 2.1, Lipase mildly elevated at 85, VBG = 7.31/57/24/29, lactate wnl. UA notable for proteinuria, +BG otherwise no nitrates, leukocytes, bacteria.     Admitted to medicine for further management.  (05 Nov 2024 18:45)    Endocrinte team consulted for DM management.   Dx with DM  "couple of years ago". HE was being managed by PCP, who he would see every 3-4 months.  His A1c 14%. HIs home medication is  mg 1 tab daily.   He does not check fS at home. He also admits to not following low carb diet.  HIs diet includes orange juice and pepsi.    weight: lost 20 lbs unintentionally ine on month   c/b: no cad, no cva    here, tolerating diet, denies n/v/d/ap    social h/o: lives with grandson, denies alcohol/tobacco or ivda     PAST MEDICAL & SURGICAL HISTORY:  HTN (hypertension)      HLD (hyperlipidemia)      Gout      Prostate cancer      Pain in left shoulder      Type 2 diabetes mellitus      Inguinal hernia      History of abdominal prostatectomy      History of robot-assisted laparoscopic radical prostatectomy      History of lithotripsy      H/O repair of left rotator cuff      H/O inguinal hernia repair          FAMILY HISTORY:  Cerebrovascular accident (Mother)    Family history of prostate cancer in father (Father)    Family history of throat cancer (Sibling)    Family history of stomach cancer (Sibling)        Social History:    Outpatient Medications:    MEDICATIONS  (STANDING):  amLODIPine   Tablet 10 milliGRAM(s) Oral daily  atorvastatin 40 milliGRAM(s) Oral at bedtime  cefTRIAXone   IVPB 1000 milliGRAM(s) IV Intermittent every 24 hours  cholecalciferol 2000 Unit(s) Oral daily  dextrose 50% Injectable 25 Gram(s) IV Push once  dextrose 50% Injectable 25 Gram(s) IV Push once  dextrose 50% Injectable 12.5 Gram(s) IV Push once  enoxaparin Injectable 40 milliGRAM(s) SubCutaneous every 24 hours  ezetimibe 10 milliGRAM(s) Oral daily  glucagon  Injectable 1 milliGRAM(s) IntraMuscular once  influenza  Vaccine (HIGH DOSE) 0.5 milliLiter(s) IntraMuscular once  insulin lispro (ADMELOG) corrective regimen sliding scale   SubCutaneous three times a day before meals  insulin lispro (ADMELOG) corrective regimen sliding scale   SubCutaneous at bedtime  metroNIDAZOLE  IVPB 500 milliGRAM(s) IV Intermittent every 8 hours  multivitamin 1 Tablet(s) Oral daily    MEDICATIONS  (PRN):  acetaminophen     Tablet .. 650 milliGRAM(s) Oral every 6 hours PRN Temp greater or equal to 38C (100.4F), Mild Pain (1 - 3)  dextrose Oral Gel 15 Gram(s) Oral once PRN Blood Glucose LESS THAN 70 milliGRAM(s)/deciliter  melatonin 3 milliGRAM(s) Oral at bedtime PRN Insomnia  ondansetron Injectable 4 milliGRAM(s) IV Push every 8 hours PRN Nausea and/or Vomiting      Allergies    cipro/quinalin (Unknown)  Cipro (Other)    Intolerances      Review of Systems:  Constitutional: No fever  Eyes: No blurry vision  Neuro: No tremors  HEENT: No pain  Cardiovascular: No chest pain, palpitations  Respiratory: No SOB, no cough  GI: No nausea, vomiting, abdominal pain  : No dysuria  Skin: no rash  Psych: no depression  Endocrine: ++ polyuria, polydipsia  Hem/lymph: no swelling  Osteoporosis: no fractures    ALL OTHER SYSTEMS REVIEWED AND NEGATIVE    PHYSICAL EXAM:  VITALS: T(C): 36.6 (11-06-24 @ 14:23)  T(F): 97.9 (11-06-24 @ 14:23), Max: 98.1 (11-06-24 @ 01:42)  HR: 55 (11-06-24 @ 14:23) (51 - 58)  BP: 118/72 (11-06-24 @ 14:23) (118/72 - 135/76)  RR:  (17 - 18)  SpO2:  (97% - 100%)  Wt(kg): --  GENERAL: NAD, well-groomed, well-developed  EYES: No proptosis, no lid lag, anicteric  HEENT:  Atraumatic, Normocephalic, moist mucous membranes  THYROID: Normal size, no palpable nodules  GI: Soft, mild tender to palpitation of the llq, non distended, normal bowel sounds  PSYCH: Alert and oriented x 3, normal affect, normal mood  CUSHING'S SIGNS: no striae    POCT Blood Glucose.: 251 mg/dL (11-06-24 @ 12:04)  POCT Blood Glucose.: 230 mg/dL (11-06-24 @ 08:21)  POCT Blood Glucose.: 218 mg/dL (11-06-24 @ 02:59)  POCT Blood Glucose.: 222 mg/dL (11-05-24 @ 21:55)  POCT Blood Glucose.: 266 mg/dL (11-05-24 @ 19:39)  POCT Blood Glucose.: 314 mg/dL (11-05-24 @ 17:59)  POCT Blood Glucose.: 374 mg/dL (11-05-24 @ 14:52)                            11.9   6.14  )-----------( 136      ( 06 Nov 2024 05:12 )             34.9       11-06    134[L]  |  101  |  25[H]  ----------------------------<  215[H]  4.4   |  22  |  1.57[H]    eGFR: 45[L]    Ca    9.2      11-06  Mg     2.10     11-05  Phos  2.7     11-05    TPro  6.5  /  Alb  3.5  /  TBili  0.6  /  DBili  x   /  AST  12  /  ALT  12  /  AlkPhos  69  11-06 11-06 Chol 115 Direct LDL -- LDL calculated 58 HDL 37[L] Trig 100

## 2024-11-06 NOTE — DISCHARGE NOTE PROVIDER - HOSPITAL COURSE
Mr. Austin is a 73 y/o M with PMH of prostate cancer (sp prostatectomy), HTN, HLD, NIDDTM2, Hx of diverticulitis, Gout who presented from home after OP CT scan without IV contrast from today notable for findings of acute sigmoid diverticulitis. Pt here with daughter Suyapa and reports he has been having LLQ abd pain for past 1-2 weeks, on-off but became more severe on Sunday, prompting him to go to his PCP for further eval. He was given amoxicillin 500mg BID and took 4x doses from sunday to tuesday am and went for CT scan which confirmed findings of acute sigmoid diverticulitis and came to the ED for further care at the direction of his PCP. Reports abd pain only when palpating, not severe/constant, reports being able to eat (had chicken this AM) and without nausea/vomiting, fevers, chills, diarrhea, constipation, fevers, chills, sob, muscle/joint aches at home. no sick contacts, recent surgeries, or new medications. reports overall condition to be slightly improving since sunday. +pass gas.     In the ED, HDS, afebrile. received IV Unasyn 3g x1, IV NS bolus x1, IV acetaminophen x1. Labs notable for no leukocytosis, Na 132, Cr 1.9, , Mg 2.1, Lipase mildly elevated at 85, VBG = 7.31/57/24/29, lactate wnl. UA notable for proteinuria, +BG otherwise no nitrates, leukocytes, bacteria.     Admitted to medicine for further management.       Hospital Course:  Continued on antibiotics, diet advanced, seen by surgery, pain improved.   For uncontrolled DM2 seen by endocrine and medications adjusted.    PT no needs  dc home

## 2024-11-06 NOTE — CONSULT NOTE ADULT - SUBJECTIVE AND OBJECTIVE BOX
General Surgery Consult  Consulting surgical team: A  Consulting attending: Dr. Villalobos    HPI:  74M hx prostate cancer s/p robotic prostatectomy '08 cb incisional umbilical hernia, right inguinal hernia s/p robotic assisted repair w/ incisional umbilical hernia repair by Dr. Villalobos '22 HTN, HLD, DM gout, and diverticulitis, pw abdominal pain. Reports LLQ pain started on 11/30 and has progressively worsened. Denies fevers, chills, nausea, emesis, or diarrhea. Tolerating PO liquids and solids. Reports three previous episodes of diverticulitis in the past, managed medically. He presented to his PCP on Monday who sent him for an outpatient CT, with only report available, sf acute uncomplicated sigmoid diverticulitis. He reports taking amoxacillin that he had had at home before presenting to the ED. Colorectal surgery consulted iso diverticulitis. Patient reports passing flatus. Last BM was Sunday, well-formed, without blood. Last colonoscopy was last year, per pt significant for non-cancerous polyps, which were removed.    PAST MEDICAL HISTORY:  HTN (hypertension)    HLD (hyperlipidemia)    Gout    Prostate cancer    Pain in left shoulder    Type 2 diabetes mellitus    Inguinal hernia        PAST SURGICAL HISTORY:  History of abdominal prostatectomy    History of robot-assisted laparoscopic radical prostatectomy    History of lithotripsy    H/O repair of left rotator cuff    H/O inguinal hernia repair        MEDICATIONS:  acetaminophen     Tablet .. 650 milliGRAM(s) Oral every 6 hours PRN  amLODIPine   Tablet 10 milliGRAM(s) Oral daily  atorvastatin 40 milliGRAM(s) Oral at bedtime  cefTRIAXone   IVPB 1000 milliGRAM(s) IV Intermittent every 24 hours  cholecalciferol 2000 Unit(s) Oral daily  dextrose 50% Injectable 25 Gram(s) IV Push once  dextrose 50% Injectable 12.5 Gram(s) IV Push once  dextrose 50% Injectable 25 Gram(s) IV Push once  dextrose Oral Gel 15 Gram(s) Oral once PRN  enoxaparin Injectable 40 milliGRAM(s) SubCutaneous every 24 hours  ezetimibe 10 milliGRAM(s) Oral daily  glucagon  Injectable 1 milliGRAM(s) IntraMuscular once  influenza  Vaccine (HIGH DOSE) 0.5 milliLiter(s) IntraMuscular once  insulin lispro (ADMELOG) corrective regimen sliding scale   SubCutaneous at bedtime  insulin lispro (ADMELOG) corrective regimen sliding scale   SubCutaneous three times a day before meals  melatonin 3 milliGRAM(s) Oral at bedtime PRN  metroNIDAZOLE  IVPB 500 milliGRAM(s) IV Intermittent every 8 hours  multivitamin 1 Tablet(s) Oral daily  ondansetron Injectable 4 milliGRAM(s) IV Push every 8 hours PRN      ALLERGIES:  cipro/quinalin (Unknown)  Cipro (Other)      VITALS & I/Os:  Vital Signs Last 24 Hrs  T(C): 36.7 (06 Nov 2024 06:00), Max: 36.9 (05 Nov 2024 14:44)  T(F): 98.1 (06 Nov 2024 06:00), Max: 98.4 (05 Nov 2024 14:44)  HR: 51 (06 Nov 2024 06:00) (51 - 84)  BP: 126/80 (06 Nov 2024 06:00) (120/67 - 129/76)  BP(mean): 83 (05 Nov 2024 20:18) (83 - 83)  RR: 17 (06 Nov 2024 06:00) (17 - 18)  SpO2: 99% (06 Nov 2024 06:00) (96% - 100%)    Parameters below as of 06 Nov 2024 06:00  Patient On (Oxygen Delivery Method): room air        I&O's Summary    05 Nov 2024 07:01  -  06 Nov 2024 07:00  --------------------------------------------------------  IN: 1405 mL / OUT: 200 mL / NET: 1205 mL    06 Nov 2024 07:01  -  06 Nov 2024 11:17  --------------------------------------------------------  IN: 500 mL / OUT: 350 mL / NET: 150 mL        PHYSICAL EXAM:  General: Not acutely distressed  Respiratory: Nonlabored respirations  Cardiovascular: Pulse present  Abdominal: Soft, nondistended, mild LLQ tenderness. No rebound or guarding. No organomegaly, no palpable mass  Extremities: Warm    LABS:                        11.9   6.14  )-----------( 136      ( 06 Nov 2024 05:12 )             34.9     11-06    134[L]  |  101  |  25[H]  ----------------------------<  215[H]  4.4   |  22  |  1.57[H]    Ca    9.2      06 Nov 2024 05:12  Phos  2.7     11-05  Mg     2.10     11-05    TPro  6.5  /  Alb  3.5  /  TBili  0.6  /  DBili  x   /  AST  12  /  ALT  12  /  AlkPhos  69  11-06    Lactate:  11-05 @ 16:10  1.6              Urinalysis Basic - ( 06 Nov 2024 05:12 )    Color: x / Appearance: x / SG: x / pH: x  Gluc: 215 mg/dL / Ketone: x  / Bili: x / Urobili: x   Blood: x / Protein: x / Nitrite: x   Leuk Esterase: x / RBC: x / WBC x   Sq Epi: x / Non Sq Epi: x / Bacteria: x        IMAGING:  Outpatient CT report: there is significant segmental bowel wall thickening at the sigmoid colon and surrounding edema, along with numerous diverticula, consistent with acute sigmoid diverticulitis. No definitive perforation. No abscess. No bowel obstruction.

## 2024-11-06 NOTE — DISCHARGE NOTE PROVIDER - NSDCFUSCHEDAPPT_GEN_ALL_CORE_FT
Rica Benson  [facility]  [address]  Scheduled Appointment: 11/21/2024

## 2024-11-07 ENCOUNTER — TRANSCRIPTION ENCOUNTER (OUTPATIENT)
Age: 74
End: 2024-11-07

## 2024-11-07 VITALS — WEIGHT: 214.95 LBS

## 2024-11-07 LAB
ANION GAP SERPL CALC-SCNC: 13 MMOL/L — SIGNIFICANT CHANGE UP (ref 7–14)
BUN SERPL-MCNC: 19 MG/DL — SIGNIFICANT CHANGE UP (ref 7–23)
CALCIUM SERPL-MCNC: 9.7 MG/DL — SIGNIFICANT CHANGE UP (ref 8.4–10.5)
CHLORIDE SERPL-SCNC: 99 MMOL/L — SIGNIFICANT CHANGE UP (ref 98–107)
CO2 SERPL-SCNC: 23 MMOL/L — SIGNIFICANT CHANGE UP (ref 22–31)
CREAT SERPL-MCNC: 1.56 MG/DL — HIGH (ref 0.5–1.3)
EGFR: 46 ML/MIN/1.73M2 — LOW
GLUCOSE BLDC GLUCOMTR-MCNC: 122 MG/DL — HIGH (ref 70–99)
GLUCOSE BLDC GLUCOMTR-MCNC: 167 MG/DL — HIGH (ref 70–99)
GLUCOSE BLDC GLUCOMTR-MCNC: 247 MG/DL — HIGH (ref 70–99)
GLUCOSE SERPL-MCNC: 127 MG/DL — HIGH (ref 70–99)
HCT VFR BLD CALC: 39.2 % — SIGNIFICANT CHANGE UP (ref 39–50)
HGB BLD-MCNC: 13.2 G/DL — SIGNIFICANT CHANGE UP (ref 13–17)
MCHC RBC-ENTMCNC: 28.4 PG — SIGNIFICANT CHANGE UP (ref 27–34)
MCHC RBC-ENTMCNC: 33.7 G/DL — SIGNIFICANT CHANGE UP (ref 32–36)
MCV RBC AUTO: 84.5 FL — SIGNIFICANT CHANGE UP (ref 80–100)
NRBC # BLD: 0 /100 WBCS — SIGNIFICANT CHANGE UP (ref 0–0)
NRBC # FLD: 0 K/UL — SIGNIFICANT CHANGE UP (ref 0–0)
PLATELET # BLD AUTO: 154 K/UL — SIGNIFICANT CHANGE UP (ref 150–400)
POTASSIUM SERPL-MCNC: 4.2 MMOL/L — SIGNIFICANT CHANGE UP (ref 3.5–5.3)
POTASSIUM SERPL-SCNC: 4.2 MMOL/L — SIGNIFICANT CHANGE UP (ref 3.5–5.3)
RBC # BLD: 4.64 M/UL — SIGNIFICANT CHANGE UP (ref 4.2–5.8)
RBC # FLD: 12.5 % — SIGNIFICANT CHANGE UP (ref 10.3–14.5)
SODIUM SERPL-SCNC: 135 MMOL/L — SIGNIFICANT CHANGE UP (ref 135–145)
TSH SERPL-MCNC: 2.06 UIU/ML — SIGNIFICANT CHANGE UP (ref 0.27–4.2)
WBC # BLD: 4.97 K/UL — SIGNIFICANT CHANGE UP (ref 3.8–10.5)
WBC # FLD AUTO: 4.97 K/UL — SIGNIFICANT CHANGE UP (ref 3.8–10.5)

## 2024-11-07 PROCEDURE — 99232 SBSQ HOSP IP/OBS MODERATE 35: CPT

## 2024-11-07 PROCEDURE — 99239 HOSP IP/OBS DSCHRG MGMT >30: CPT

## 2024-11-07 RX ORDER — POLYETHYLENE GLYCOL 3350 17 G/17G
17 POWDER, FOR SOLUTION ORAL
Qty: 0 | Refills: 0 | DISCHARGE
Start: 2024-11-07

## 2024-11-07 RX ORDER — AMOXICILLIN 500 MG
1 CAPSULE ORAL
Refills: 0 | DISCHARGE

## 2024-11-07 RX ORDER — AMOXICILLIN AND CLAVULANATE POTASSIUM 600; 42.9 MG/5ML; MG/5ML
1 POWDER, FOR SUSPENSION ORAL
Qty: 16 | Refills: 0
Start: 2024-11-07 | End: 2024-11-14

## 2024-11-07 RX ORDER — POLYETHYLENE GLYCOL 3350 17 G/17G
17 POWDER, FOR SOLUTION ORAL DAILY
Refills: 0 | Status: DISCONTINUED | OUTPATIENT
Start: 2024-11-07 | End: 2024-11-07

## 2024-11-07 RX ORDER — INSULIN LISPRO 100/ML
4 VIAL (ML) SUBCUTANEOUS
Refills: 0 | Status: DISCONTINUED | OUTPATIENT
Start: 2024-11-07 | End: 2024-11-07

## 2024-11-07 RX ORDER — B-COMPLEX WITH VITAMIN C
1 VIAL (ML) INJECTION
Qty: 0 | Refills: 0 | DISCHARGE
Start: 2024-11-07

## 2024-11-07 RX ORDER — INSULIN GLARGINE,HUM.REC.ANLOG 100/ML
10 VIAL (ML) SUBCUTANEOUS
Qty: 5 | Refills: 0
Start: 2024-11-07

## 2024-11-07 RX ORDER — SEMAGLUTIDE 1.34 MG/ML
0.5 INJECTION, SOLUTION SUBCUTANEOUS
Qty: 1 | Refills: 0
Start: 2024-11-07

## 2024-11-07 RX ORDER — DULAGLUTIDE 0.75 MG/.5ML
0.75 INJECTION, SOLUTION SUBCUTANEOUS
Qty: 4 | Refills: 0
Start: 2024-11-07

## 2024-11-07 RX ADMIN — Medication 1 TABLET(S): at 11:46

## 2024-11-07 RX ADMIN — Medication 100 MILLIGRAM(S): at 11:52

## 2024-11-07 RX ADMIN — METRONIDAZOLE 100 MILLIGRAM(S): 250 TABLET ORAL at 14:22

## 2024-11-07 RX ADMIN — Medication 1: at 17:33

## 2024-11-07 RX ADMIN — EZETIMIBE 10 MILLIGRAM(S): 10 TABLET ORAL at 11:46

## 2024-11-07 RX ADMIN — METRONIDAZOLE 100 MILLIGRAM(S): 250 TABLET ORAL at 05:03

## 2024-11-07 RX ADMIN — Medication 650 MILLIGRAM(S): at 14:36

## 2024-11-07 RX ADMIN — Medication 4 UNIT(S): at 17:32

## 2024-11-07 RX ADMIN — Medication 3 UNIT(S): at 12:27

## 2024-11-07 RX ADMIN — Medication 650 MILLIGRAM(S): at 15:06

## 2024-11-07 RX ADMIN — Medication 3 UNIT(S): at 08:45

## 2024-11-07 RX ADMIN — Medication 2: at 12:27

## 2024-11-07 RX ADMIN — Medication 2000 UNIT(S): at 11:46

## 2024-11-07 RX ADMIN — POLYETHYLENE GLYCOL 3350 17 GRAM(S): 17 POWDER, FOR SOLUTION ORAL at 10:23

## 2024-11-07 RX ADMIN — Medication 10 MILLIGRAM(S): at 05:13

## 2024-11-07 NOTE — DIETITIAN INITIAL EVALUATION ADULT - WEIGHT (KG)
Would like to know if can double up on cariprazine (Vraylar) 1.5 MG capsule it's working.     Then would need a re-fill    WANTS A CALL BACK 86.1

## 2024-11-07 NOTE — DIETITIAN INITIAL EVALUATION ADULT - PERTINENT MEDS FT
MEDICATIONS  (STANDING):  allopurinol 100 milliGRAM(s) Oral daily  amLODIPine   Tablet 10 milliGRAM(s) Oral daily  atorvastatin 40 milliGRAM(s) Oral at bedtime  cefTRIAXone   IVPB 1000 milliGRAM(s) IV Intermittent every 24 hours  cholecalciferol 2000 Unit(s) Oral daily  dextrose 50% Injectable 25 Gram(s) IV Push once  dextrose 50% Injectable 25 Gram(s) IV Push once  dextrose 50% Injectable 12.5 Gram(s) IV Push once  enoxaparin Injectable 40 milliGRAM(s) SubCutaneous every 24 hours  ezetimibe 10 milliGRAM(s) Oral daily  glucagon  Injectable 1 milliGRAM(s) IntraMuscular once  influenza  Vaccine (HIGH DOSE) 0.5 milliLiter(s) IntraMuscular once  insulin glargine Injectable (LANTUS) 10 Unit(s) SubCutaneous at bedtime  insulin lispro (ADMELOG) corrective regimen sliding scale   SubCutaneous at bedtime  insulin lispro (ADMELOG) corrective regimen sliding scale   SubCutaneous three times a day before meals  insulin lispro Injectable (ADMELOG) 3 Unit(s) SubCutaneous three times a day before meals  metroNIDAZOLE  IVPB 500 milliGRAM(s) IV Intermittent every 8 hours  multivitamin 1 Tablet(s) Oral daily  polyethylene glycol 3350 17 Gram(s) Oral daily    MEDICATIONS  (PRN):  acetaminophen     Tablet .. 650 milliGRAM(s) Oral every 6 hours PRN Temp greater or equal to 38C (100.4F), Mild Pain (1 - 3)  dextrose Oral Gel 15 Gram(s) Oral once PRN Blood Glucose LESS THAN 70 milliGRAM(s)/deciliter  melatonin 3 milliGRAM(s) Oral at bedtime PRN Insomnia  ondansetron Injectable 4 milliGRAM(s) IV Push every 8 hours PRN Nausea and/or Vomiting

## 2024-11-07 NOTE — PROGRESS NOTE ADULT - ASSESSMENT
Assessment: 74M pw acute uncomplicated diverticulitis. History of multiple similar episodes within the past few years.    Plan:  - No acute surgical intervention- please have patient follow-up with Dr. Villalobos outpatient for possible elective resection  - Patient tolerating diet, advance as tolerated  - Continue abx- on CTX, flagyl at this time  - Will sign off, please call back if further concerns or questions    [facility] Surgery  [id number]

## 2024-11-07 NOTE — PROGRESS NOTE ADULT - PROBLEM SELECTOR PLAN 3
1.9, presumed baseline 1.6 not meeting KAELYN criteria  - back to baseline  - monito, trend  - resume ARB on dc, Cr at baseline and DM2  - renally dose meds based on egfr  - avoid nsaids, nephrotoxic agents
1.9, presumed baseline 1.6 not meeting KAELYN criteria  - back to baseline  - monito, trend  - hold home omlesartan and allopurinol    - renally dose meds based on egfr  - avoid nsaids, nephrotoxic agents

## 2024-11-07 NOTE — PROGRESS NOTE ADULT - SUBJECTIVE AND OBJECTIVE BOX
Patient is a 74y old  Male who presents with a chief complaint of Sigmoid Diverticulitis (07 Nov 2024 14:04)    SUBJECTIVE / OVERNIGHT EVENTS:    feeling well, tolerating regular diet, no BM yet, passing gas, pain improved, less TTP  no CP, SOB, f/c/n/v  thinks can learn the insulin  no other issues     MEDICATIONS  (STANDING):  allopurinol 100 milliGRAM(s) Oral daily  atorvastatin 40 milliGRAM(s) Oral at bedtime  cefTRIAXone   IVPB 1000 milliGRAM(s) IV Intermittent every 24 hours  cholecalciferol 2000 Unit(s) Oral daily  enoxaparin Injectable 40 milliGRAM(s) SubCutaneous every 24 hours  ezetimibe 10 milliGRAM(s) Oral daily  glucagon  Injectable 1 milliGRAM(s) IntraMuscular once  influenza  Vaccine (HIGH DOSE) 0.5 milliLiter(s) IntraMuscular once  insulin glargine Injectable (LANTUS) 10 Unit(s) SubCutaneous at bedtime  insulin lispro (ADMELOG) corrective regimen sliding scale   SubCutaneous three times a day before meals  insulin lispro (ADMELOG) corrective regimen sliding scale   SubCutaneous at bedtime  insulin lispro Injectable (ADMELOG) 3 Unit(s) SubCutaneous three times a day before meals  metroNIDAZOLE  IVPB 500 milliGRAM(s) IV Intermittent every 8 hours  multivitamin 1 Tablet(s) Oral daily  polyethylene glycol 3350 17 Gram(s) Oral daily    MEDICATIONS  (PRN):  acetaminophen     Tablet .. 650 milliGRAM(s) Oral every 6 hours PRN Temp greater or equal to 38C (100.4F), Mild Pain (1 - 3)  dextrose Oral Gel 15 Gram(s) Oral once PRN Blood Glucose LESS THAN 70 milliGRAM(s)/deciliter  melatonin 3 milliGRAM(s) Oral at bedtime PRN Insomnia  ondansetron Injectable 4 milliGRAM(s) IV Push every 8 hours PRN Nausea and/or Vomiting    T(C): 36.9 (11-07-24 @ 10:00), Max: 36.9 (11-06-24 @ 21:43)  HR: 58 (11-07-24 @ 10:00) (52 - 58)  BP: 119/67 (11-07-24 @ 10:00) (110/60 - 128/70)  RR: 18 (11-07-24 @ 10:00) (18 - 18)  SpO2: 97% (11-07-24 @ 10:00) (96% - 100%)    CAPILLARY BLOOD GLUCOSE  POCT Blood Glucose.: 247 mg/dL (07 Nov 2024 12:09)  POCT Blood Glucose.: 122 mg/dL (07 Nov 2024 08:25)  POCT Blood Glucose.: 105 mg/dL (06 Nov 2024 22:05)  POCT Blood Glucose.: 149 mg/dL (06 Nov 2024 17:28)  POCT Blood Glucose.: 157 mg/dL (06 Nov 2024 16:58)    I&O's Summary    06 Nov 2024 07:01  -  07 Nov 2024 07:00  --------------------------------------------------------  IN: 1240 mL / OUT: 1350 mL / NET: -110 mL    07 Nov 2024 07:01  -  07 Nov 2024 14:44  --------------------------------------------------------  IN: 400 mL / OUT: 400 mL / NET: 0 mL    PHYSICAL EXAM:  GENERAL: NAD   CHEST/LUNG: Clear to auscultation bilaterally; No wheeze  HEART: Regular rate and rhythm; No murmurs, rubs, or gallops  ABDOMEN: Soft, less TTP LLQ, no r/g  EXTREMITIES:   warm and well perfused, No clubbing, cyanosis, or edema  PSYCH: AAOx3   NEUROLOGY: non-focal    LABS:                        13.2   4.97  )-----------( 154      ( 07 Nov 2024 06:55 )             39.2     11-07    135  |  99  |  19  ----------------------------<  127[H]  4.2   |  23  |  1.56[H]    Ca    9.7      07 Nov 2024 06:55  Phos  2.7     11-05  Mg     2.10     11-05    TPro  6.5  /  Alb  3.5  /  TBili  0.6  /  DBili  x   /  AST  12  /  ALT  12  /  AlkPhos  69  11-06    Care Discussed with Consultants/Other Providers:  
Chief Complaint: DM type 2     Interval Events: Daughter and granddaughter at bedside.  Fasting glucose at goal.  Patient was able to tolerate his breakfast. No N/V or abdominal pain.     MEDICATIONS  (STANDING):  allopurinol 100 milliGRAM(s) Oral daily  amLODIPine   Tablet 10 milliGRAM(s) Oral daily  atorvastatin 40 milliGRAM(s) Oral at bedtime  cefTRIAXone   IVPB 1000 milliGRAM(s) IV Intermittent every 24 hours  cholecalciferol 2000 Unit(s) Oral daily  dextrose 50% Injectable 25 Gram(s) IV Push once  dextrose 50% Injectable 25 Gram(s) IV Push once  dextrose 50% Injectable 12.5 Gram(s) IV Push once  enoxaparin Injectable 40 milliGRAM(s) SubCutaneous every 24 hours  ezetimibe 10 milliGRAM(s) Oral daily  glucagon  Injectable 1 milliGRAM(s) IntraMuscular once  influenza  Vaccine (HIGH DOSE) 0.5 milliLiter(s) IntraMuscular once  insulin glargine Injectable (LANTUS) 10 Unit(s) SubCutaneous at bedtime  insulin lispro (ADMELOG) corrective regimen sliding scale   SubCutaneous three times a day before meals  insulin lispro (ADMELOG) corrective regimen sliding scale   SubCutaneous at bedtime  insulin lispro Injectable (ADMELOG) 3 Unit(s) SubCutaneous three times a day before meals  metroNIDAZOLE  IVPB 500 milliGRAM(s) IV Intermittent every 8 hours  multivitamin 1 Tablet(s) Oral daily  polyethylene glycol 3350 17 Gram(s) Oral daily    MEDICATIONS  (PRN):  acetaminophen     Tablet .. 650 milliGRAM(s) Oral every 6 hours PRN Temp greater or equal to 38C (100.4F), Mild Pain (1 - 3)  dextrose Oral Gel 15 Gram(s) Oral once PRN Blood Glucose LESS THAN 70 milliGRAM(s)/deciliter  melatonin 3 milliGRAM(s) Oral at bedtime PRN Insomnia  ondansetron Injectable 4 milliGRAM(s) IV Push every 8 hours PRN Nausea and/or Vomiting      Allergies  cipro/quinalin (Unknown)  Cipro (Other)    Review of Systems:  Constitutional: No fever/chills   Eyes: No blurry vision  Neuro: No tremors  HEENT: No pain  Cardiovascular: No chest pain, no palpitations  Respiratory: No SOB, no cough  GI: No nausea, vomiting or abdominal pain  : No dysuria  Skin: no rash  Endocrine: no polyuria, polydipsia      VITALS: T(C): 36.9 (11-07-24 @ 10:00)  T(F): 98.5 (11-07-24 @ 10:00), Max: 98.5 (11-07-24 @ 10:00)  HR: 58 (11-07-24 @ 10:00) (52 - 58)  BP: 119/67 (11-07-24 @ 10:00) (110/60 - 128/70)  RR:  (18 - 18)  SpO2:  (96% - 100%)  Wt(kg): --      Physical Exam:   GENERAL: NAD, well-groomed, well-developed  EYES: No proptosis, no lid lag, anicteric  HEENT:  Atraumatic, Normocephalic, moist mucous membranes  RESPIRATORY: non labored breathing   GI: Soft, nontender, non distended  SKIN: Dry, intact, No rashes or lesions  MUSCULOSKELETAL: Full range of motion, normal strength  NEURO: A&Ox3      CAPILLARY BLOOD GLUCOSE      POCT Blood Glucose.: 247 mg/dL (07 Nov 2024 12:09)  POCT Blood Glucose.: 122 mg/dL (07 Nov 2024 08:25)  POCT Blood Glucose.: 105 mg/dL (06 Nov 2024 22:05)  POCT Blood Glucose.: 149 mg/dL (06 Nov 2024 17:28)  POCT Blood Glucose.: 157 mg/dL (06 Nov 2024 16:58)      11-07    135  |  99  |  19  ----------------------------<  127[H]  4.2   |  23  |  1.56[H]    eGFR: 46[L]    Ca    9.7      11-07  Mg     2.10     11-05  Phos  2.7     11-05    TPro  6.5  /  Alb  3.5  /  TBili  0.6  /  DBili  x   /  AST  12  /  ALT  12  /  AlkPhos  69  11-06      A1C with Estimated Average Glucose Result: 14.0 % (11-06-24 @ 05:12)      Thyroid Function Tests:  11-07 @ 06:55 TSH 2.06 FreeT4 -- T3 -- Anti TPO -- Anti Thyroglobulin Ab -- TSI --  
SURGERY DAILY PROGRESS NOTE    SUBJECTIVE: Patient seen and evaluated on AM rounds. Patient reports interval improvement of abdominal pain. He tolerated liquids yesterday, advanced to regular diet. Passing flatus and having bowel movements. Reports 3 episodes of uncomplicated diverticulitis in the last few years, all resolved with abx alone.    OBJECTIVE:  Vital Signs Last 24 Hrs  T(C): 36.9 (07 Nov 2024 10:00), Max: 36.9 (06 Nov 2024 21:43)  T(F): 98.5 (07 Nov 2024 10:00), Max: 98.5 (07 Nov 2024 10:00)  HR: 58 (07 Nov 2024 10:00) (51 - 58)  BP: 119/67 (07 Nov 2024 10:00) (110/60 - 135/76)  BP(mean): --  RR: 18 (07 Nov 2024 10:00) (18 - 18)  SpO2: 97% (07 Nov 2024 10:00) (96% - 100%)    Parameters below as of 07 Nov 2024 10:00  Patient On (Oxygen Delivery Method): room air      I&O's Detail    06 Nov 2024 07:01  -  07 Nov 2024 07:00  --------------------------------------------------------  IN:    Oral Fluid: 1240 mL  Total IN: 1240 mL    OUT:    IV PiggyBack: 0 mL    Voided (mL): 1350 mL  Total OUT: 1350 mL    Total NET: -110 mL    Daily   MEDICATIONS  (STANDING):  allopurinol 100 milliGRAM(s) Oral daily  amLODIPine   Tablet 10 milliGRAM(s) Oral daily  atorvastatin 40 milliGRAM(s) Oral at bedtime  cefTRIAXone   IVPB 1000 milliGRAM(s) IV Intermittent every 24 hours  cholecalciferol 2000 Unit(s) Oral daily  dextrose 50% Injectable 25 Gram(s) IV Push once  dextrose 50% Injectable 25 Gram(s) IV Push once  dextrose 50% Injectable 12.5 Gram(s) IV Push once  enoxaparin Injectable 40 milliGRAM(s) SubCutaneous every 24 hours  ezetimibe 10 milliGRAM(s) Oral daily  glucagon  Injectable 1 milliGRAM(s) IntraMuscular once  influenza  Vaccine (HIGH DOSE) 0.5 milliLiter(s) IntraMuscular once  insulin glargine Injectable (LANTUS) 10 Unit(s) SubCutaneous at bedtime  insulin lispro (ADMELOG) corrective regimen sliding scale   SubCutaneous at bedtime  insulin lispro (ADMELOG) corrective regimen sliding scale   SubCutaneous three times a day before meals  insulin lispro Injectable (ADMELOG) 3 Unit(s) SubCutaneous three times a day before meals  metroNIDAZOLE  IVPB 500 milliGRAM(s) IV Intermittent every 8 hours  multivitamin 1 Tablet(s) Oral daily  polyethylene glycol 3350 17 Gram(s) Oral daily    MEDICATIONS  (PRN):  acetaminophen     Tablet .. 650 milliGRAM(s) Oral every 6 hours PRN Temp greater or equal to 38C (100.4F), Mild Pain (1 - 3)  dextrose Oral Gel 15 Gram(s) Oral once PRN Blood Glucose LESS THAN 70 milliGRAM(s)/deciliter  melatonin 3 milliGRAM(s) Oral at bedtime PRN Insomnia  ondansetron Injectable 4 milliGRAM(s) IV Push every 8 hours PRN Nausea and/or Vomiting    LABS:                        13.2   4.97  )-----------( 154      ( 07 Nov 2024 06:55 )             39.2     11-07    135  |  99  |  19  ----------------------------<  127[H]  4.2   |  23  |  1.56[H]    Ca    9.7      07 Nov 2024 06:55  Phos  2.7     11-05  Mg     2.10     11-05    TPro  6.5  /  Alb  3.5  /  TBili  0.6  /  DBili  x   /  AST  12  /  ALT  12  /  AlkPhos  69  11-06    Urinalysis Basic - ( 07 Nov 2024 06:55 )    Color: x / Appearance: x / SG: x / pH: x  Gluc: 127 mg/dL / Ketone: x  / Bili: x / Urobili: x   Blood: x / Protein: x / Nitrite: x   Leuk Esterase: x / RBC: x / WBC x   Sq Epi: x / Non Sq Epi: x / Bacteria: x      PHYSICAL EXAM:  Constitutional: NAD  Neuro: A&O x3, grossly moving all 4 extremities  Pulmonary: Symmetric chest rise bilaterally, no increased WOB, on room air   Gastrointestinal: Abdomen soft, LLQ tenderness, nondistended, no rebound or guarding  Extremities: Warm, well perfused  
Patient is a 74y old  Male who presents with a chief complaint of Sigmoid Diverticulitis (06 Nov 2024 15:09)    SUBJECTIVE / OVERNIGHT EVENTS:    no CP, SOB, f/c/n/v  pain only on palpation of LLQ, not sure if got CT every time dx with diverticulitis, thinks had 3-4 times   noted increased thrist/hunger and urination for some time, not clear on prior HbA1c    MEDICATIONS  (STANDING):  amLODIPine   Tablet 10 milliGRAM(s) Oral daily  atorvastatin 40 milliGRAM(s) Oral at bedtime  cefTRIAXone   IVPB 1000 milliGRAM(s) IV Intermittent every 24 hours  cholecalciferol 2000 Unit(s) Oral daily  enoxaparin Injectable 40 milliGRAM(s) SubCutaneous every 24 hours  ezetimibe 10 milliGRAM(s) Oral daily  glucagon  Injectable 1 milliGRAM(s) IntraMuscular once  influenza  Vaccine (HIGH DOSE) 0.5 milliLiter(s) IntraMuscular once  insulin glargine Injectable (LANTUS) 10 Unit(s) SubCutaneous at bedtime  insulin lispro (ADMELOG) corrective regimen sliding scale   SubCutaneous at bedtime  insulin lispro (ADMELOG) corrective regimen sliding scale   SubCutaneous three times a day before meals  insulin lispro Injectable (ADMELOG) 3 Unit(s) SubCutaneous three times a day before meals  metroNIDAZOLE  IVPB 500 milliGRAM(s) IV Intermittent every 8 hours  multivitamin 1 Tablet(s) Oral daily    MEDICATIONS  (PRN):  acetaminophen     Tablet .. 650 milliGRAM(s) Oral every 6 hours PRN Temp greater or equal to 38C (100.4F), Mild Pain (1 - 3)  dextrose Oral Gel 15 Gram(s) Oral once PRN Blood Glucose LESS THAN 70 milliGRAM(s)/deciliter  melatonin 3 milliGRAM(s) Oral at bedtime PRN Insomnia  ondansetron Injectable 4 milliGRAM(s) IV Push every 8 hours PRN Nausea and/or Vomiting    T(C): 36.7 (11-06-24 @ 17:53), Max: 36.7 (11-05-24 @ 22:09)  HR: 58 (11-06-24 @ 17:53) (51 - 58)  BP: 128/70 (11-06-24 @ 17:53) (116/76 - 135/76)  RR: 18 (11-06-24 @ 17:53) (17 - 18)  SpO2: 98% (11-06-24 @ 17:53) (97% - 100%)    CAPILLARY BLOOD GLUCOSE  POCT Blood Glucose.: 149 mg/dL (06 Nov 2024 17:28)  POCT Blood Glucose.: 157 mg/dL (06 Nov 2024 16:58)  POCT Blood Glucose.: 251 mg/dL (06 Nov 2024 12:04)  POCT Blood Glucose.: 230 mg/dL (06 Nov 2024 08:21)  POCT Blood Glucose.: 218 mg/dL (06 Nov 2024 02:59)  POCT Blood Glucose.: 222 mg/dL (05 Nov 2024 21:55)    I&O's Summary    05 Nov 2024 07:01  -  06 Nov 2024 07:00  --------------------------------------------------------  IN: 1405 mL / OUT: 200 mL / NET: 1205 mL    06 Nov 2024 07:01  -  06 Nov 2024 19:44  --------------------------------------------------------  IN: 1000 mL / OUT: 850 mL / NET: 150 mL    PHYSICAL EXAM:  GENERAL: NAD   CHEST/LUNG: Clear to auscultation bilaterally; No wheeze  HEART: Regular rate and rhythm; No murmurs, rubs, or gallops  ABDOMEN: Soft, TTP LLQ, no r/g  EXTREMITIES:   warm and well perfused, No clubbing, cyanosis, or edema  PSYCH: AAOx3   NEUROLOGY: non-focal    LABS:                        11.9   6.14  )-----------( 136      ( 06 Nov 2024 05:12 )             34.9     11-06    134[L]  |  101  |  25[H]  ----------------------------<  215[H]  4.4   |  22  |  1.57[H]    Ca    9.2      06 Nov 2024 05:12  Phos  2.7     11-05  Mg     2.10     11-05    TPro  6.5  /  Alb  3.5  /  TBili  0.6  /  DBili  x   /  AST  12  /  ALT  12  /  AlkPhos  69  11-06    Care Discussed with Consultants/Other Providers:

## 2024-11-07 NOTE — DIETITIAN INITIAL EVALUATION ADULT - NUTRITION DIAGNOSITC TERMINOLOGY #1
Food & Nutrition Related Knowledge Deficit/Altered Nutrition Related Lab Values/Limited adherence to nutrition-related recommendations

## 2024-11-07 NOTE — DIETITIAN INITIAL EVALUATION ADULT - ETIOLOGY
physiological causes, endocrine dysfunction, poor compliance to DM diet, reports lack of prior nutrition exposure

## 2024-11-07 NOTE — PROGRESS NOTE ADULT - PROBLEM SELECTOR PLAN 4
HBA1c 14%, on orals at home  - RD consult  - DM diet  - appreciate endo eval, lantus 10 units and 3 units w/ meals  - insulin teaching
HBA1c 14%, on orals at home  - RD consult  - DM diet  - dc on Trulicity, metformin, basal lantus

## 2024-11-07 NOTE — DISCHARGE NOTE NURSING/CASE MANAGEMENT/SOCIAL WORK - FINANCIAL ASSISTANCE
[facility] provides services at a reduced cost to those who are determined to be eligible through [facility]’s financial assistance program. Information regarding [facility]’s financial assistance program can be found by going to [email or web address] or by calling [phone].

## 2024-11-07 NOTE — PROGRESS NOTE ADULT - PROBLEM SELECTOR PLAN 8
Lovenox ppx  no PT needs  dc pending glycemic control/insulin teach, PO tolerance
Lovenox ppx  no PT needs  dc home, dc time 36 min

## 2024-11-07 NOTE — DISCHARGE NOTE NURSING/CASE MANAGEMENT/SOCIAL WORK - PATIENT PORTAL LINK FT
You can access the FollowMyHealth Patient Portal offered by [facility] by registering at the following website: [email or web address]. By joining [employer]’s FollowMyHealth portal, you will also be able to view your health information using other applications (apps) compatible with our system.

## 2024-11-07 NOTE — DIETITIAN INITIAL EVALUATION ADULT - OTHER INFO
Patient reports excellent appetite and po intake. Consuming >75% of meals. Denies any nausea/vomiting/diarrhea or difficulty chewing and swallowing. +Constipation last reported bowel movement on 11/3-on bowel regimen. RD provided the patient with extensive verbal and written DM diet education; including, various food groups, sources of carbohydrate, DM myplate method, carb counting, label reading, meal planning, pre-prandial and post-prandial finger stick goals, and HbA1c goal. Patient was also made aware of the physiological implications of poor glycemic control. Patient with good receptiveness to information provided. verbalized good understanding.

## 2024-11-07 NOTE — DIETITIAN INITIAL EVALUATION ADULT - PERTINENT LABORATORY DATA
11-07    135  |  99  |  19  ----------------------------<  127[H]  4.2   |  23  |  1.56[H]    Ca    9.7      07 Nov 2024 06:55  Phos  2.7     11-05  Mg     2.10     11-05    TPro  6.5  /  Alb  3.5  /  TBili  0.6  /  DBili  x   /  AST  12  /  ALT  12  /  AlkPhos  69  11-06  POCT Blood Glucose.: 122 mg/dL (11-07-24 @ 08:25)  A1C with Estimated Average Glucose Result: 14.0 % (11-06-24 @ 05:12)

## 2024-11-07 NOTE — PROGRESS NOTE ADULT - PROBLEM SELECTOR PLAN 1
LLQ for some time  - OP CT imaging on 11/5/24 AM showing: "acute sigmoid diverticulitis with signficant wall thickening and pericolonic edema. There is no abscess, no perforation, no bowel obstruction."   - has had prior diverticulitis in June this year, 4th incident overall (first in 2022). No recent surgeries.   - was on PO amoxicillin op, sp unasyn in the ED, c/w ctx and metronidazole--> dc on course of Augmentin 10d total, 8 more  - tolerating diet  - appreciate surg eval--OP f/u  - OP c-scope on resolution, states 1 year prior he thinks
LLQ for some time  - OP CT imaging on 11/5/24 AM showing: "acute sigmoid diverticulitis with signficant wall thickening and pericolonic edema. There is no abscess, no perforation, no bowel obstruction."   - has had prior diverticulitis in June this year, 4th incident overall (first in 2022). No recent surgeries.   - was on PO amoxicillin op, sp unasyn in the ED, c/w ctx and metronidazole--> likely dc on course of augmentin 10d total   - advance diet as tolerated  - appreciate surg eval   - OP c-scope on resolution if not uptodate

## 2024-11-07 NOTE — PROGRESS NOTE ADULT - PROBLEM SELECTOR PLAN 5
- hold ARB i/s/o of precious  - c/w home amlodipine
BP at goal on amlodipine alone but with DM  - resume ARB on dc  - dc amlodipine  - PCP f/u

## 2024-11-07 NOTE — DIETITIAN INITIAL EVALUATION ADULT - ORAL INTAKE PTA/DIET HISTORY
Met with patient at bedside. Patient report good appetite and po intake PTA. Noticed that he had polyuria starting September which he associates with worsening DM. Patient admits to poor compliance to DM diet. Denies prior nutrition education. NKFA. Usual weight 200lbs -denies any weight changes. Current weight 214lbs/97.5kg on 11/5.

## 2024-11-07 NOTE — DIETITIAN INITIAL EVALUATION ADULT - REASON FOR ADMISSION
Diverticulitis of intestine without perforation or abscess without bleeding    Per chart review, 75 y/o M with PMH of prostate cancer (sp prostatectomy), HTN, HLD, NIDDTM2 (HbA1c 14% on 11/6), diverticulitis, Gout who presented from home after OP CT scan without IV contrast from today notable for findings of acute sigmoid diverticulitis.

## 2024-11-07 NOTE — PROGRESS NOTE ADULT - ASSESSMENT
75 y/o M with PMH of prostate cancer (sp prostatectomy), HTN, HLD, NIDDTM2, Hx of diverticulitis, Gout who presented from home after OP CT scan without IV contrast from today notable for findings of acute sigmoid diverticulitis. Pt here with daughter Suyapa and reports he has been having LLQ abd pain for past 1-2 weeks, on-off but became more severe on Sunday, prompting him to go to his PCP for further eval. He was given amoxicillin 500mg BID and took 4x doses from sunday to tuesday am and went for CT scan which confirmed findings of acute sigmoid diverticulitis and came to the ED for further care at the direction of his PCP.  Endocrine team consulted for DM management.     Stress Hyperglycemia  Chronic Kidney disease - cautious with insulin titration; high risk for insulin stacking and hypoglycemia  Non-compliance with diet   Diabetes Mellitus II   A1c: 14%   Home meds:   mg 1 tab daily  GFR: 45      Inpatient plan   - FS goal 100-180 mg/dl   - fasting glucose at goal   - continue Lantus 10 units at bedtime  - Increase Admelog to 4 units TID AC. Hold if not eating/NPO.   - continue low Admelog correctional scale TID AC  - continue separate low Admelog correctional scale at HS  - FS TID AC & HS ---> q6 if NPO   - hypoglycemia protocol PRN   - consistent carbohydrate diet  - RD consult  - RN to teach glucometer and insulin PEN use      Discharge plan   - Discharged on Lantus 10 units at bedtime + Trulicity 0.5 mg subQ weekly (covered by insurance) + metformin 500 mg p.o. daily  - please send prescription for glucometer and supplies - test strip, lancets, alcohol pads and insulin pen needles.   - Please send prescription for CGM Freestyle johnie 3 reader + sensors (quantity: 2 sensors). Change every 14 days. Patient does not know the password to his [employer] store.   - Please send prescription for glucose tablets 4G (take 4 tablets) or 15G tablets for blood sugar less than 70 mG/dL, repeat fingerstick in 15 minutes. Call your provider for dose adjustment if needed.  - Patient and family–daughter and granddaughter taught Trulicity pen use with return demonstration.  - Patient to call [facility] at [place]:   [address]  [place] [address] [phone]   (office made aware to call the patient for appointment on 11/07)       Dyslipidemia  - LDL goal < 70  - LDL 58   - Continue statin, zetia   - management per primary team         HTN  - BP goal < 130/80  - Continue amlodipine  - management per primary team         JAMES BalderramaP-BC  Nurse Practitioner  Division of Endocrinology & Diabetes  available via  Teams  73 y/o M with PMH of prostate cancer (sp prostatectomy), HTN, HLD, NIDDTM2, Hx of diverticulitis, Gout who presented from home after OP CT scan without IV contrast from today notable for findings of acute sigmoid diverticulitis. Pt here with daughter Suyapa and reports he has been having LLQ abd pain for past 1-2 weeks, on-off but became more severe on Sunday, prompting him to go to his PCP for further eval. He was given amoxicillin 500mg BID and took 4x doses from sunday to tuesday am and went for CT scan which confirmed findings of acute sigmoid diverticulitis and came to the ED for further care at the direction of his PCP.  Endocrine team consulted for DM management.     Stress Hyperglycemia  Chronic Kidney disease - cautious with insulin titration; high risk for insulin stacking and hypoglycemia  Non-compliance with diet   Diabetes Mellitus II   A1c: 14%   Home meds:   mg 1 tab daily  GFR: 45      Inpatient plan   - FS goal 100-180 mg/dl   - fasting glucose at goal   - continue Lantus 10 units at bedtime  - Increase Admelog to 4 units TID AC. Hold if not eating/NPO.   - continue low Admelog correctional scale TID AC  - continue separate low Admelog correctional scale at HS  - FS TID AC & HS ---> q6 if NPO   - hypoglycemia protocol PRN   - consistent carbohydrate diet  - RD consult  - RN to teach glucometer and insulin PEN use      Discharge plan   - Discharged on Lantus 10 units at bedtime + Trulicity 0.75 mg subQ weekly (covered by insurance) + metformin 500 mg p.o. daily  - please send prescription for glucometer and supplies - test strip, lancets, alcohol pads and insulin pen needles.   - Please send prescription for CGM Freestyle johnie 3 reader + sensors (quantity: 2 sensors). Change every 14 days. Patient does not know the password to his [employer] store.   - Please send prescription for glucose tablets 4G (take 4 tablets) or 15G tablets for blood sugar less than 70 mG/dL, repeat fingerstick in 15 minutes. Call your provider for dose adjustment if needed.  - Patient and family–daughter and granddaughter taught Trulicity pen use with return demonstration.  - Patient to call [facility] at [place]:   [address]  [place] [address] [phone]   (office made aware to call the patient for appointment on 11/07)       Dyslipidemia  - LDL goal < 70  - LDL 58   - Continue statin, zetia   - management per primary team         HTN  - BP goal < 130/80  - Continue amlodipine  - management per primary team         JAMES BalderramaP-BC  Nurse Practitioner  Division of Endocrinology & Diabetes  available via  Teams

## 2024-11-21 ENCOUNTER — APPOINTMENT (OUTPATIENT)
Dept: ENDOCRINOLOGY | Facility: CLINIC | Age: 74
End: 2024-11-21

## 2025-02-04 NOTE — PATIENT PROFILE ADULT - NSPROPTRIGHTNOTIFYPHONEFAM_GEN_A_NUR
2/4/2025      Jeanne Wasserman  [address] [place] [address]      Dear Colleague,    Thank you for referring your patient, Jeanne Wasserman, to the [facility]. Please see a copy of my visit note below.    Medium Joint Injection/Arthrocentesis    Date/Time: 2/4/2025 1:39 PM    Performed by: Tye Cam MD  Authorized by: Tye Cam MD    Indications:  Pain  Needle Size:  25 G  Guidance: ultrasound    Approach:  Lateral  Location:  Ankle  Location comment:  Right peroneal tendon sheath   Medications:  6 mg betamethasone acet & sod phos 6 (3-3) MG/ML; 1 mL ROPivacaine 5 MG/ML  Outcome:  Tolerated well, no immediate complications  Procedure discussed: discussed risks, benefits, and alternatives    Consent Given by:  Patient  Timeout: timeout called immediately prior to procedure    Prep: patient was prepped and draped in usual sterile fashion     Ultrasound images of procedure were permanently stored.     Patient reported significant improvement of pain after the numbing portion right peroneal tendon sheath steroid injection.  Ultrasound guided images were permanently stored.  Aftercare instructions given to patient.  Plan to follow-up as previously discussed with referring provider.     Tye Cam MD [facility]          Again, thank you for allowing me to participate in the care of your patient.        Sincerely,        Tye Cam MD    Electronically signed [phone]

## 2025-02-27 NOTE — H&P PST ADULT - PROBLEM SELECTOR PROBLEM 6
02/27/25 0828   Team Meeting   Meeting Type Daily Rounds   Team Members Present   Team Members Present Physician;Nurse;;Other (Discipline and Name)   Physician Team Member Holter, Noonan CRNP   Nursing Team Member Claudia   Social Work Team Member Jose J ORTEGA   Other (Discipline and Name) Wang PCM   Patient/Family Present   Patient Present No   Patient's Family Present No     Groups Participation  7/10.   Patient's compliant with medications. He's engaged in his treatment. Socializes with his peers. Decreased appetite.  PT consult requested fro hand pain and stiffness.    Glaucoma

## 2025-03-04 ENCOUNTER — APPOINTMENT (OUTPATIENT)
Dept: ENDOCRINOLOGY | Facility: CLINIC | Age: 75
End: 2025-03-04

## (undated) DEVICE — SUT VLOC 180 2-0 6" GS-22 GREEN

## (undated) DEVICE — XI OBTURATOR OPTICAL BLADELESS 8MM

## (undated) DEVICE — PACK GENERAL LAPAROSCOPY

## (undated) DEVICE — SUT VICRYL 0 27" UR-6

## (undated) DEVICE — XI TIP COVER

## (undated) DEVICE — XI DRAPE ARM

## (undated) DEVICE — TUBING STRYKER PNEUMOCLEAR SMOKE EVACUATION HIGH FLOW

## (undated) DEVICE — XI ARM SCISSOR MONO CURVED

## (undated) DEVICE — NDL HYPO SAFE 22G X 1.5" (BLACK)

## (undated) DEVICE — SUT MONOCRYL 4-0 27" PS-2 UNDYED

## (undated) DEVICE — XI ARM FORCEP FENESTRATED BIPOLAR 8MM

## (undated) DEVICE — SUT VICRYL 3-0 27" SH UNDYED

## (undated) DEVICE — ELCTR GROUNDING PAD ADULT COVIDIEN

## (undated) DEVICE — GLV 7.5 PROTEXIS (BLUE)

## (undated) DEVICE — XI SEAL UNIV 5- 8 MM

## (undated) DEVICE — D HELP - CLEARVIEW CLEARIFY SYSTEM

## (undated) DEVICE — XI ARM NEEDLE DRIVER MEGA

## (undated) DEVICE — XI DRAPE COLUMN

## (undated) DEVICE — SUT PDS II 0 27" CT-2

## (undated) DEVICE — DRAPE 3/4 SHEET 52X76"

## (undated) DEVICE — GLV 7 PROTEXIS (WHITE)